# Patient Record
Sex: MALE | Race: WHITE | NOT HISPANIC OR LATINO | URBAN - METROPOLITAN AREA
[De-identification: names, ages, dates, MRNs, and addresses within clinical notes are randomized per-mention and may not be internally consistent; named-entity substitution may affect disease eponyms.]

---

## 2018-05-10 ENCOUNTER — EMERGENCY (EMERGENCY)
Facility: HOSPITAL | Age: 58
LOS: 1 days | Discharge: HOME | End: 2018-05-10
Attending: EMERGENCY MEDICINE | Admitting: EMERGENCY MEDICINE
Payer: COMMERCIAL

## 2018-05-10 VITALS
TEMPERATURE: 98 F | HEART RATE: 75 BPM | DIASTOLIC BLOOD PRESSURE: 65 MMHG | SYSTOLIC BLOOD PRESSURE: 110 MMHG | OXYGEN SATURATION: 98 % | RESPIRATION RATE: 16 BRPM

## 2018-05-10 DIAGNOSIS — Z98.89 OTHER SPECIFIED POSTPROCEDURAL STATES: Chronic | ICD-10-CM

## 2018-05-10 LAB
ALBUMIN SERPL ELPH-MCNC: 4.1 G/DL — SIGNIFICANT CHANGE UP (ref 3.3–5)
ALP SERPL-CCNC: 93 U/L — SIGNIFICANT CHANGE UP (ref 40–120)
ALT FLD-CCNC: 35 U/L — SIGNIFICANT CHANGE UP (ref 4–41)
APPEARANCE UR: CLEAR — SIGNIFICANT CHANGE UP
AST SERPL-CCNC: 22 U/L — SIGNIFICANT CHANGE UP (ref 4–40)
BASE EXCESS BLDV CALC-SCNC: 2.1 MMOL/L — SIGNIFICANT CHANGE UP
BASOPHILS # BLD AUTO: 0.02 K/UL — SIGNIFICANT CHANGE UP (ref 0–0.2)
BASOPHILS NFR BLD AUTO: 0.3 % — SIGNIFICANT CHANGE UP (ref 0–2)
BILIRUB SERPL-MCNC: 0.5 MG/DL — SIGNIFICANT CHANGE UP (ref 0.2–1.2)
BILIRUB UR-MCNC: NEGATIVE — SIGNIFICANT CHANGE UP
BLOOD GAS VENOUS - CREATININE: 0.87 MG/DL — SIGNIFICANT CHANGE UP (ref 0.5–1.3)
BLOOD UR QL VISUAL: NEGATIVE — SIGNIFICANT CHANGE UP
BUN SERPL-MCNC: 15 MG/DL — SIGNIFICANT CHANGE UP (ref 7–23)
CALCIUM SERPL-MCNC: 8.7 MG/DL — SIGNIFICANT CHANGE UP (ref 8.4–10.5)
CHLORIDE BLDV-SCNC: 109 MMOL/L — HIGH (ref 96–108)
CHLORIDE SERPL-SCNC: 102 MMOL/L — SIGNIFICANT CHANGE UP (ref 98–107)
CO2 SERPL-SCNC: 24 MMOL/L — SIGNIFICANT CHANGE UP (ref 22–31)
COLOR SPEC: YELLOW — SIGNIFICANT CHANGE UP
CREAT SERPL-MCNC: 0.9 MG/DL — SIGNIFICANT CHANGE UP (ref 0.5–1.3)
EOSINOPHIL # BLD AUTO: 0.44 K/UL — SIGNIFICANT CHANGE UP (ref 0–0.5)
EOSINOPHIL NFR BLD AUTO: 5.7 % — SIGNIFICANT CHANGE UP (ref 0–6)
GAS PNL BLDV: 138 MMOL/L — SIGNIFICANT CHANGE UP (ref 136–146)
GLUCOSE BLDV-MCNC: 107 — HIGH (ref 70–99)
GLUCOSE SERPL-MCNC: 99 MG/DL — SIGNIFICANT CHANGE UP (ref 70–99)
GLUCOSE UR-MCNC: NEGATIVE — SIGNIFICANT CHANGE UP
HCO3 BLDV-SCNC: 26 MMOL/L — SIGNIFICANT CHANGE UP (ref 20–27)
HCT VFR BLD CALC: 41.8 % — SIGNIFICANT CHANGE UP (ref 39–50)
HCT VFR BLDV CALC: 42.6 % — SIGNIFICANT CHANGE UP (ref 39–51)
HGB BLD-MCNC: 13.6 G/DL — SIGNIFICANT CHANGE UP (ref 13–17)
HGB BLDV-MCNC: 13.9 G/DL — SIGNIFICANT CHANGE UP (ref 13–17)
IMM GRANULOCYTES # BLD AUTO: 0.02 # — SIGNIFICANT CHANGE UP
IMM GRANULOCYTES NFR BLD AUTO: 0.3 % — SIGNIFICANT CHANGE UP (ref 0–1.5)
KETONES UR-MCNC: SIGNIFICANT CHANGE UP
LACTATE BLDV-MCNC: 1.2 MMOL/L — SIGNIFICANT CHANGE UP (ref 0.5–2)
LEUKOCYTE ESTERASE UR-ACNC: NEGATIVE — SIGNIFICANT CHANGE UP
LYMPHOCYTES # BLD AUTO: 2.41 K/UL — SIGNIFICANT CHANGE UP (ref 1–3.3)
LYMPHOCYTES # BLD AUTO: 31.1 % — SIGNIFICANT CHANGE UP (ref 13–44)
MCHC RBC-ENTMCNC: 28.5 PG — SIGNIFICANT CHANGE UP (ref 27–34)
MCHC RBC-ENTMCNC: 32.5 % — SIGNIFICANT CHANGE UP (ref 32–36)
MCV RBC AUTO: 87.6 FL — SIGNIFICANT CHANGE UP (ref 80–100)
MONOCYTES # BLD AUTO: 0.58 K/UL — SIGNIFICANT CHANGE UP (ref 0–0.9)
MONOCYTES NFR BLD AUTO: 7.5 % — SIGNIFICANT CHANGE UP (ref 2–14)
MUCOUS THREADS # UR AUTO: SIGNIFICANT CHANGE UP
NEUTROPHILS # BLD AUTO: 4.27 K/UL — SIGNIFICANT CHANGE UP (ref 1.8–7.4)
NEUTROPHILS NFR BLD AUTO: 55.1 % — SIGNIFICANT CHANGE UP (ref 43–77)
NITRITE UR-MCNC: NEGATIVE — SIGNIFICANT CHANGE UP
NRBC # FLD: 0 — SIGNIFICANT CHANGE UP
PCO2 BLDV: 43 MMHG — SIGNIFICANT CHANGE UP (ref 41–51)
PH BLDV: 7.41 PH — SIGNIFICANT CHANGE UP (ref 7.32–7.43)
PH UR: 6 — SIGNIFICANT CHANGE UP (ref 4.6–8)
PLATELET # BLD AUTO: 275 K/UL — SIGNIFICANT CHANGE UP (ref 150–400)
PMV BLD: 9.8 FL — SIGNIFICANT CHANGE UP (ref 7–13)
PO2 BLDV: 56 MMHG — HIGH (ref 35–40)
POTASSIUM BLDV-SCNC: 3.5 MMOL/L — SIGNIFICANT CHANGE UP (ref 3.4–4.5)
POTASSIUM SERPL-MCNC: 3.8 MMOL/L — SIGNIFICANT CHANGE UP (ref 3.5–5.3)
POTASSIUM SERPL-SCNC: 3.8 MMOL/L — SIGNIFICANT CHANGE UP (ref 3.5–5.3)
PROT SERPL-MCNC: 6.8 G/DL — SIGNIFICANT CHANGE UP (ref 6–8.3)
PROT UR-MCNC: 20 MG/DL — SIGNIFICANT CHANGE UP
RBC # BLD: 4.77 M/UL — SIGNIFICANT CHANGE UP (ref 4.2–5.8)
RBC # FLD: 14.6 % — HIGH (ref 10.3–14.5)
RBC CASTS # UR COMP ASSIST: SIGNIFICANT CHANGE UP (ref 0–?)
SAO2 % BLDV: 88.1 % — HIGH (ref 60–85)
SODIUM SERPL-SCNC: 140 MMOL/L — SIGNIFICANT CHANGE UP (ref 135–145)
SP GR SPEC: 1.03 — SIGNIFICANT CHANGE UP (ref 1–1.04)
UROBILINOGEN FLD QL: NORMAL MG/DL — SIGNIFICANT CHANGE UP
WBC # BLD: 7.74 K/UL — SIGNIFICANT CHANGE UP (ref 3.8–10.5)
WBC # FLD AUTO: 7.74 K/UL — SIGNIFICANT CHANGE UP (ref 3.8–10.5)
WBC UR QL: SIGNIFICANT CHANGE UP (ref 0–?)

## 2018-05-10 PROCEDURE — 99284 EMERGENCY DEPT VISIT MOD MDM: CPT

## 2018-05-10 RX ORDER — SODIUM CHLORIDE 9 MG/ML
1000 INJECTION, SOLUTION INTRAVENOUS ONCE
Qty: 0 | Refills: 0 | Status: COMPLETED | OUTPATIENT
Start: 2018-05-10 | End: 2018-05-10

## 2018-05-10 RX ORDER — ACETAMINOPHEN 500 MG
650 TABLET ORAL ONCE
Qty: 0 | Refills: 0 | Status: COMPLETED | OUTPATIENT
Start: 2018-05-10 | End: 2018-05-10

## 2018-05-10 RX ADMIN — Medication 650 MILLIGRAM(S): at 21:15

## 2018-05-10 RX ADMIN — SODIUM CHLORIDE 1000 MILLILITER(S): 9 INJECTION, SOLUTION INTRAVENOUS at 21:15

## 2018-05-10 RX ADMIN — Medication 650 MILLIGRAM(S): at 22:42

## 2018-05-10 NOTE — ED PROVIDER NOTE - ATTENDING CONTRIBUTION TO CARE
Pt c/o low abd pain  constipation   pain worse with cough  no fever  ?if similar to diverticulitis in the past  exam  pt in no visible distress  clear lungs  card RRR S1S2  abd obese  mild tenderness deep palpation LLQ  and suprapubic  no CVAT  negative Gonzalez's  Imp  h/o diverticulitis and hernia repair  with lower abd pain  no BM today  cold be due to sigmoid inflammation vs early SBO  will image

## 2018-05-10 NOTE — ED PROVIDER NOTE - MEDICAL DECISION MAKING DETAILS
58M hx of diverticulitis, proctitis, s/p b/l inguinal hernia repair presents with a cc of b/l lower abdominal pain c/w prior episodes of diverticulitis however worse a/w decreased BM x1 day, increased straining, no prior hx of SBO, not passing gas, was seen by urgent care today for same cc, instructed to present to ED for eval. Mild dysuria x2 days. exam vss well appearing b/l lower abdominal ttp R testicular cystocele at baseline, exam otherwise unremarkable c/f sbo vs diverticulitis will get labs ua ctap, ivf, pain control, reassess

## 2018-05-10 NOTE — ED PROVIDER NOTE - OBJECTIVE STATEMENT
58M hx of diverticulitis, proctitis, s/p b/l inguinal hernia repair presents with a cc of b/l lower abdominal pain c/w prior episodes of diverticulitis however worse a/w decreased BM x1 day, increased straining, no prior hx of SBO, not passing gas, was seen by urgent care today for same cc, instructed to present to ED for eval. Mild dysuria x2 days. Notes increased abdominal fullness. Hx chronic R testicular hydrocele, at baseline, no c/o testicular pain or discharge. Denies n/v/f/c/cp/sob. Denies headache, syncope, lightheadedness, dizziness. Denies chest palpitations Denies hematuria, hematochezia, BRBPR, tarry stools, diarrhea, constipation.

## 2018-05-10 NOTE — ED ADULT TRIAGE NOTE - CHIEF COMPLAINT QUOTE
pt c/o abd pain x2 days. denies n/v/fever/chills. last normal BM this morning. pt was seen at urgent care. sent into ED for eval to r/o possible bowel obstruction or diverticulitis. pt appears comfortable.  hx- diverticulitis, hernia repair.

## 2018-05-11 VITALS
DIASTOLIC BLOOD PRESSURE: 73 MMHG | RESPIRATION RATE: 16 BRPM | OXYGEN SATURATION: 98 % | HEART RATE: 61 BPM | TEMPERATURE: 99 F | SYSTOLIC BLOOD PRESSURE: 120 MMHG

## 2018-05-11 PROCEDURE — 74177 CT ABD & PELVIS W/CONTRAST: CPT | Mod: 26

## 2018-05-11 RX ORDER — METRONIDAZOLE 500 MG
500 TABLET ORAL ONCE
Qty: 0 | Refills: 0 | Status: DISCONTINUED | OUTPATIENT
Start: 2018-05-11 | End: 2018-05-11

## 2018-05-11 RX ORDER — CIPROFLOXACIN LACTATE 400MG/40ML
500 VIAL (ML) INTRAVENOUS ONCE
Qty: 0 | Refills: 0 | Status: DISCONTINUED | OUTPATIENT
Start: 2018-05-11 | End: 2018-05-11

## 2018-05-11 RX ADMIN — Medication 1 TABLET(S): at 03:28

## 2018-05-11 NOTE — ED ADULT NURSE REASSESSMENT NOTE - NS ED NURSE REASSESS COMMENT FT1
pt medications administered and being DC by MD. pt received education on diagnosis and medications. pt had no further questions. pt receiving a work note and being DC. pt able to ambulate without assistance and leaving the unit in NAD

## 2018-05-12 LAB
BACTERIA UR CULT: SIGNIFICANT CHANGE UP
SPECIMEN SOURCE: SIGNIFICANT CHANGE UP

## 2018-05-29 NOTE — ED ADULT NURSE NOTE - TEMPERATURE IN CELSIUS (DEGREES C)
Paroxetine refilled through upcoming appointment.     Routed to PCP to advise if ok to refill tizanidine.    36.8

## 2019-05-30 ENCOUNTER — EMERGENCY (EMERGENCY)
Facility: HOSPITAL | Age: 59
LOS: 1 days | Discharge: HOME | End: 2019-05-30
Attending: EMERGENCY MEDICINE | Admitting: EMERGENCY MEDICINE
Payer: COMMERCIAL

## 2019-05-30 VITALS
RESPIRATION RATE: 16 BRPM | OXYGEN SATURATION: 100 % | HEART RATE: 68 BPM | TEMPERATURE: 98 F | DIASTOLIC BLOOD PRESSURE: 73 MMHG | SYSTOLIC BLOOD PRESSURE: 143 MMHG

## 2019-05-30 VITALS
SYSTOLIC BLOOD PRESSURE: 151 MMHG | OXYGEN SATURATION: 99 % | RESPIRATION RATE: 18 BRPM | HEART RATE: 70 BPM | DIASTOLIC BLOOD PRESSURE: 65 MMHG | TEMPERATURE: 98 F

## 2019-05-30 VITALS
DIASTOLIC BLOOD PRESSURE: 71 MMHG | TEMPERATURE: 98 F | OXYGEN SATURATION: 97 % | RESPIRATION RATE: 16 BRPM | HEART RATE: 64 BPM | SYSTOLIC BLOOD PRESSURE: 149 MMHG

## 2019-05-30 DIAGNOSIS — Z98.89 OTHER SPECIFIED POSTPROCEDURAL STATES: Chronic | ICD-10-CM

## 2019-05-30 LAB
ALBUMIN SERPL ELPH-MCNC: 4.2 G/DL — SIGNIFICANT CHANGE UP (ref 3.3–5)
ALP SERPL-CCNC: 68 U/L — SIGNIFICANT CHANGE UP (ref 40–120)
ALT FLD-CCNC: 28 U/L — SIGNIFICANT CHANGE UP (ref 4–41)
ANION GAP SERPL CALC-SCNC: 14 MMO/L — SIGNIFICANT CHANGE UP (ref 7–14)
APPEARANCE UR: CLEAR — SIGNIFICANT CHANGE UP
AST SERPL-CCNC: 29 U/L — SIGNIFICANT CHANGE UP (ref 4–40)
BASE EXCESS BLDV CALC-SCNC: 1.8 MMOL/L — SIGNIFICANT CHANGE UP
BASOPHILS # BLD AUTO: 0.02 K/UL — SIGNIFICANT CHANGE UP (ref 0–0.2)
BASOPHILS NFR BLD AUTO: 0.3 % — SIGNIFICANT CHANGE UP (ref 0–2)
BILIRUB SERPL-MCNC: 0.3 MG/DL — SIGNIFICANT CHANGE UP (ref 0.2–1.2)
BILIRUB UR-MCNC: NEGATIVE — SIGNIFICANT CHANGE UP
BLOOD GAS VENOUS - CREATININE: 1.05 MG/DL — SIGNIFICANT CHANGE UP (ref 0.5–1.3)
BLOOD GAS VENOUS - FIO2: 21 — SIGNIFICANT CHANGE UP
BLOOD UR QL VISUAL: NEGATIVE — SIGNIFICANT CHANGE UP
BUN SERPL-MCNC: 13 MG/DL — SIGNIFICANT CHANGE UP (ref 7–23)
CALCIUM SERPL-MCNC: 9.1 MG/DL — SIGNIFICANT CHANGE UP (ref 8.4–10.5)
CHLORIDE BLDV-SCNC: 106 MMOL/L — SIGNIFICANT CHANGE UP (ref 96–108)
CHLORIDE SERPL-SCNC: 103 MMOL/L — SIGNIFICANT CHANGE UP (ref 98–107)
CO2 SERPL-SCNC: 22 MMOL/L — SIGNIFICANT CHANGE UP (ref 22–31)
COLOR SPEC: COLORLESS — SIGNIFICANT CHANGE UP
CREAT SERPL-MCNC: 0.98 MG/DL — SIGNIFICANT CHANGE UP (ref 0.5–1.3)
EOSINOPHIL # BLD AUTO: 0.09 K/UL — SIGNIFICANT CHANGE UP (ref 0–0.5)
EOSINOPHIL NFR BLD AUTO: 1.3 % — SIGNIFICANT CHANGE UP (ref 0–6)
GAS PNL BLDV: 139 MMOL/L — SIGNIFICANT CHANGE UP (ref 136–146)
GLUCOSE BLDV-MCNC: 101 MG/DL — HIGH (ref 70–99)
GLUCOSE SERPL-MCNC: 105 MG/DL — HIGH (ref 70–99)
GLUCOSE UR-MCNC: NEGATIVE — SIGNIFICANT CHANGE UP
HCO3 BLDV-SCNC: 25 MMOL/L — SIGNIFICANT CHANGE UP (ref 20–27)
HCT VFR BLD CALC: 40.7 % — SIGNIFICANT CHANGE UP (ref 39–50)
HCT VFR BLDV CALC: 42.4 % — SIGNIFICANT CHANGE UP (ref 39–51)
HGB BLD-MCNC: 13.5 G/DL — SIGNIFICANT CHANGE UP (ref 13–17)
HGB BLDV-MCNC: 13.8 G/DL — SIGNIFICANT CHANGE UP (ref 13–17)
IMM GRANULOCYTES NFR BLD AUTO: 0.4 % — SIGNIFICANT CHANGE UP (ref 0–1.5)
KETONES UR-MCNC: NEGATIVE — SIGNIFICANT CHANGE UP
LACTATE BLDV-MCNC: 1.4 MMOL/L — SIGNIFICANT CHANGE UP (ref 0.5–2)
LEUKOCYTE ESTERASE UR-ACNC: NEGATIVE — SIGNIFICANT CHANGE UP
LIDOCAIN IGE QN: 17.1 U/L — SIGNIFICANT CHANGE UP (ref 7–60)
LYMPHOCYTES # BLD AUTO: 2.1 K/UL — SIGNIFICANT CHANGE UP (ref 1–3.3)
LYMPHOCYTES # BLD AUTO: 31.3 % — SIGNIFICANT CHANGE UP (ref 13–44)
MCHC RBC-ENTMCNC: 28.8 PG — SIGNIFICANT CHANGE UP (ref 27–34)
MCHC RBC-ENTMCNC: 33.2 % — SIGNIFICANT CHANGE UP (ref 32–36)
MCV RBC AUTO: 86.8 FL — SIGNIFICANT CHANGE UP (ref 80–100)
MONOCYTES # BLD AUTO: 0.66 K/UL — SIGNIFICANT CHANGE UP (ref 0–0.9)
MONOCYTES NFR BLD AUTO: 9.8 % — SIGNIFICANT CHANGE UP (ref 2–14)
NEUTROPHILS # BLD AUTO: 3.82 K/UL — SIGNIFICANT CHANGE UP (ref 1.8–7.4)
NEUTROPHILS NFR BLD AUTO: 56.9 % — SIGNIFICANT CHANGE UP (ref 43–77)
NITRITE UR-MCNC: NEGATIVE — SIGNIFICANT CHANGE UP
NRBC # FLD: 0 K/UL — SIGNIFICANT CHANGE UP (ref 0–0)
PCO2 BLDV: 45 MMHG — SIGNIFICANT CHANGE UP (ref 41–51)
PH BLDV: 7.39 PH — SIGNIFICANT CHANGE UP (ref 7.32–7.43)
PH UR: 6.5 — SIGNIFICANT CHANGE UP (ref 5–8)
PLATELET # BLD AUTO: 253 K/UL — SIGNIFICANT CHANGE UP (ref 150–400)
PMV BLD: 10.3 FL — SIGNIFICANT CHANGE UP (ref 7–13)
PO2 BLDV: 40 MMHG — SIGNIFICANT CHANGE UP (ref 35–40)
POTASSIUM BLDV-SCNC: 3.7 MMOL/L — SIGNIFICANT CHANGE UP (ref 3.4–4.5)
POTASSIUM SERPL-MCNC: 3.9 MMOL/L — SIGNIFICANT CHANGE UP (ref 3.5–5.3)
POTASSIUM SERPL-SCNC: 3.9 MMOL/L — SIGNIFICANT CHANGE UP (ref 3.5–5.3)
PROT SERPL-MCNC: 6.7 G/DL — SIGNIFICANT CHANGE UP (ref 6–8.3)
PROT UR-MCNC: NEGATIVE — SIGNIFICANT CHANGE UP
RBC # BLD: 4.69 M/UL — SIGNIFICANT CHANGE UP (ref 4.2–5.8)
RBC # FLD: 14.9 % — HIGH (ref 10.3–14.5)
RBC CASTS # UR COMP ASSIST: SIGNIFICANT CHANGE UP (ref 0–?)
SAO2 % BLDV: 73 % — SIGNIFICANT CHANGE UP (ref 60–85)
SODIUM SERPL-SCNC: 139 MMOL/L — SIGNIFICANT CHANGE UP (ref 135–145)
SP GR SPEC: 1.01 — SIGNIFICANT CHANGE UP (ref 1–1.04)
UROBILINOGEN FLD QL: NORMAL — SIGNIFICANT CHANGE UP
WBC # BLD: 6.72 K/UL — SIGNIFICANT CHANGE UP (ref 3.8–10.5)
WBC # FLD AUTO: 6.72 K/UL — SIGNIFICANT CHANGE UP (ref 3.8–10.5)
WBC UR QL: SIGNIFICANT CHANGE UP (ref 0–?)

## 2019-05-30 PROCEDURE — 74177 CT ABD & PELVIS W/CONTRAST: CPT | Mod: 26

## 2019-05-30 PROCEDURE — 99284 EMERGENCY DEPT VISIT MOD MDM: CPT | Mod: 25

## 2019-05-30 RX ORDER — SODIUM CHLORIDE 9 MG/ML
1000 INJECTION INTRAMUSCULAR; INTRAVENOUS; SUBCUTANEOUS ONCE
Refills: 0 | Status: COMPLETED | OUTPATIENT
Start: 2019-05-30 | End: 2019-05-30

## 2019-05-30 RX ADMIN — SODIUM CHLORIDE 2000 MILLILITER(S): 9 INJECTION INTRAMUSCULAR; INTRAVENOUS; SUBCUTANEOUS at 05:04

## 2019-05-30 NOTE — ED PROVIDER NOTE - ATTENDING CONTRIBUTION TO CARE
Mccarty: 59 yom with diverticultitis twice yearly, followed by GI. Pt noted pain started today. left sided similar to previous, no associated fever, nausea, or vomiting. Pt is well appearing, no distress, clear lungs, normal cardiac, abd soft, only tender in the llq, no cvat. Pt is currently being treated for UTI and prostatitis. Given possibility of other infectious source besides diverticulitis (althought less likely) will CT, pain meds refused, pt usually takes Augmentin.

## 2019-05-30 NOTE — ED PROVIDER NOTE - OBJECTIVE STATEMENT
59M hx of recurrent diverticulitis presenting with LLQ pain similar to old diverticulitis x 1d. Pt also has a constipation, which he had with prior diverticulitis. Otherwise, denies fever, chills, nausea or vomiting. Also denies any chest pain, back pain. No diarrhea endorsed. Pt has no hx of abd abscess.

## 2019-05-30 NOTE — ED ADULT TRIAGE NOTE - CHIEF COMPLAINT QUOTE
Pt c/o left sided abd pain since yesterday 4pm. States "the pain feels similar to when I had diverticulitis." Also endorsing constipation, last BM yesterday at 6pm. Reports last BM was "hard little pellets." Denies n/v/d, fever. PMHx diverticulitis.

## 2019-05-30 NOTE — ED PROVIDER NOTE - CLINICAL SUMMARY MEDICAL DECISION MAKING FREE TEXT BOX
r/o diverticulitis - will get basics, lipase, gas, ivf, and CT with IVC to r/o abscess and diverticulitis.

## 2019-05-30 NOTE — ED PROVIDER NOTE - NSFOLLOWUPINSTRUCTIONS_ED_ALL_ED_FT
- See the PMD in 2 days for follow up.  - Take Motrin 600mg every 6 hour AND/OR Tylenol 650mg every 4 hour for pain.   - Return to the ED with any worsening of the symptoms.  - See your GI in 2 days for follow up.

## 2019-05-30 NOTE — ED ADULT NURSE NOTE - OBJECTIVE STATEMENT
60 yo M AAOx4 received to room 22 c/o abdominal pain, hx diverticulitis, reports "this pain feels like it did when I had diverticulitis," primarily LLQ but diffuse across lower abdomen, accompanied with constipation, last BM 2 days ago, abd firm, slightly tender and distended, + flatus, MD Mccarty at bedside, 20G placed to LAC, labs drawn and sent, fluids infusing, pt awaiting CT scan at this time, will monitor 58 yo M AAOx4 received to room 22 c/o abdominal pain, hx diverticulitis, reports "this pain feels like it did when I had diverticulitis," primarily LLQ but diffuse across lower abdomen, denies n/v/d, accompanied with constipation, last BM 2 days ago, abd firm, slightly tender and distended, + flatus, MD Mccarty at bedside, 20G placed to LAC, labs drawn and sent, fluids infusing, pt awaiting CT scan at this time, will monitor

## 2019-05-31 VITALS
SYSTOLIC BLOOD PRESSURE: 115 MMHG | DIASTOLIC BLOOD PRESSURE: 72 MMHG | RESPIRATION RATE: 16 BRPM | TEMPERATURE: 97 F | HEART RATE: 64 BPM | OXYGEN SATURATION: 99 %

## 2019-05-31 LAB
ALBUMIN SERPL ELPH-MCNC: 4.4 G/DL — SIGNIFICANT CHANGE UP (ref 3.3–5)
ALP SERPL-CCNC: 69 U/L — SIGNIFICANT CHANGE UP (ref 40–120)
ALT FLD-CCNC: 28 U/L — SIGNIFICANT CHANGE UP (ref 4–41)
ANION GAP SERPL CALC-SCNC: 11 MMO/L — SIGNIFICANT CHANGE UP (ref 7–14)
APPEARANCE UR: CLEAR — SIGNIFICANT CHANGE UP
AST SERPL-CCNC: 27 U/L — SIGNIFICANT CHANGE UP (ref 4–40)
BACTERIA UR CULT: SIGNIFICANT CHANGE UP
BASOPHILS # BLD AUTO: 0.02 K/UL — SIGNIFICANT CHANGE UP (ref 0–0.2)
BASOPHILS NFR BLD AUTO: 0.3 % — SIGNIFICANT CHANGE UP (ref 0–2)
BILIRUB SERPL-MCNC: 0.3 MG/DL — SIGNIFICANT CHANGE UP (ref 0.2–1.2)
BILIRUB UR-MCNC: NEGATIVE — SIGNIFICANT CHANGE UP
BLOOD UR QL VISUAL: NEGATIVE — SIGNIFICANT CHANGE UP
BUN SERPL-MCNC: 7 MG/DL — SIGNIFICANT CHANGE UP (ref 7–23)
CALCIUM SERPL-MCNC: 9.3 MG/DL — SIGNIFICANT CHANGE UP (ref 8.4–10.5)
CHLORIDE SERPL-SCNC: 105 MMOL/L — SIGNIFICANT CHANGE UP (ref 98–107)
CO2 SERPL-SCNC: 25 MMOL/L — SIGNIFICANT CHANGE UP (ref 22–31)
COLOR SPEC: SIGNIFICANT CHANGE UP
CREAT SERPL-MCNC: 0.79 MG/DL — SIGNIFICANT CHANGE UP (ref 0.5–1.3)
EOSINOPHIL # BLD AUTO: 0.06 K/UL — SIGNIFICANT CHANGE UP (ref 0–0.5)
EOSINOPHIL NFR BLD AUTO: 0.8 % — SIGNIFICANT CHANGE UP (ref 0–6)
GLUCOSE SERPL-MCNC: 98 MG/DL — SIGNIFICANT CHANGE UP (ref 70–99)
GLUCOSE UR-MCNC: NEGATIVE — SIGNIFICANT CHANGE UP
HCT VFR BLD CALC: 42.7 % — SIGNIFICANT CHANGE UP (ref 39–50)
HGB BLD-MCNC: 14.1 G/DL — SIGNIFICANT CHANGE UP (ref 13–17)
IMM GRANULOCYTES NFR BLD AUTO: 0.3 % — SIGNIFICANT CHANGE UP (ref 0–1.5)
KETONES UR-MCNC: NEGATIVE — SIGNIFICANT CHANGE UP
LEUKOCYTE ESTERASE UR-ACNC: NEGATIVE — SIGNIFICANT CHANGE UP
LYMPHOCYTES # BLD AUTO: 2.77 K/UL — SIGNIFICANT CHANGE UP (ref 1–3.3)
LYMPHOCYTES # BLD AUTO: 38.5 % — SIGNIFICANT CHANGE UP (ref 13–44)
MAGNESIUM SERPL-MCNC: 2.2 MG/DL — SIGNIFICANT CHANGE UP (ref 1.6–2.6)
MCHC RBC-ENTMCNC: 29 PG — SIGNIFICANT CHANGE UP (ref 27–34)
MCHC RBC-ENTMCNC: 33 % — SIGNIFICANT CHANGE UP (ref 32–36)
MCV RBC AUTO: 87.7 FL — SIGNIFICANT CHANGE UP (ref 80–100)
MONOCYTES # BLD AUTO: 0.72 K/UL — SIGNIFICANT CHANGE UP (ref 0–0.9)
MONOCYTES NFR BLD AUTO: 10 % — SIGNIFICANT CHANGE UP (ref 2–14)
NEUTROPHILS # BLD AUTO: 3.61 K/UL — SIGNIFICANT CHANGE UP (ref 1.8–7.4)
NEUTROPHILS NFR BLD AUTO: 50.1 % — SIGNIFICANT CHANGE UP (ref 43–77)
NITRITE UR-MCNC: NEGATIVE — SIGNIFICANT CHANGE UP
NRBC # FLD: 0 K/UL — SIGNIFICANT CHANGE UP (ref 0–0)
PH UR: 6 — SIGNIFICANT CHANGE UP (ref 5–8)
PHOSPHATE SERPL-MCNC: 3 MG/DL — SIGNIFICANT CHANGE UP (ref 2.5–4.5)
PLATELET # BLD AUTO: 276 K/UL — SIGNIFICANT CHANGE UP (ref 150–400)
PMV BLD: 10.6 FL — SIGNIFICANT CHANGE UP (ref 7–13)
POTASSIUM SERPL-MCNC: 3.9 MMOL/L — SIGNIFICANT CHANGE UP (ref 3.5–5.3)
POTASSIUM SERPL-SCNC: 3.9 MMOL/L — SIGNIFICANT CHANGE UP (ref 3.5–5.3)
PROT SERPL-MCNC: 6.8 G/DL — SIGNIFICANT CHANGE UP (ref 6–8.3)
PROT UR-MCNC: NEGATIVE — SIGNIFICANT CHANGE UP
RBC # BLD: 4.87 M/UL — SIGNIFICANT CHANGE UP (ref 4.2–5.8)
RBC # FLD: 15 % — HIGH (ref 10.3–14.5)
RBC CASTS # UR COMP ASSIST: SIGNIFICANT CHANGE UP (ref 0–?)
SODIUM SERPL-SCNC: 141 MMOL/L — SIGNIFICANT CHANGE UP (ref 135–145)
SP GR SPEC: 1.01 — SIGNIFICANT CHANGE UP (ref 1–1.04)
SPECIMEN SOURCE: SIGNIFICANT CHANGE UP
UROBILINOGEN FLD QL: NORMAL — SIGNIFICANT CHANGE UP
WBC # BLD: 7.2 K/UL — SIGNIFICANT CHANGE UP (ref 3.8–10.5)
WBC # FLD AUTO: 7.2 K/UL — SIGNIFICANT CHANGE UP (ref 3.8–10.5)
WBC UR QL: SIGNIFICANT CHANGE UP (ref 0–?)

## 2019-05-31 PROCEDURE — 76770 US EXAM ABDO BACK WALL COMP: CPT | Mod: 26

## 2019-05-31 PROCEDURE — 71046 X-RAY EXAM CHEST 2 VIEWS: CPT | Mod: 26

## 2019-05-31 RX ORDER — LIDOCAINE 4 G/100G
1 CREAM TOPICAL ONCE
Refills: 0 | Status: COMPLETED | OUTPATIENT
Start: 2019-05-31 | End: 2019-05-31

## 2019-05-31 RX ADMIN — LIDOCAINE 1 PATCH: 4 CREAM TOPICAL at 01:59

## 2019-05-31 NOTE — ED ADULT NURSE NOTE - NSIMPLEMENTINTERV_GEN_ALL_ED
Implemented All Universal Safety Interventions:  [place] to call system. Call bell, personal items and telephone within reach. Instruct patient to call for assistance. Room bathroom lighting operational. Non-slip footwear when patient is off stretcher. Physically safe environment: no spills, clutter or unnecessary equipment. Stretcher in lowest position, wheels locked, appropriate side rails in place.

## 2019-05-31 NOTE — ED PROVIDER NOTE - CLINICAL SUMMARY MEDICAL DECISION MAKING FREE TEXT BOX
60yo M with flank pain. Likely nephrolithiasis vs. MSK pain. Will obtain ultrasound and labwork and then assess and treat. Will give lidocaine patch for pain for now.

## 2019-05-31 NOTE — ED ADULT NURSE REASSESSMENT NOTE - GENERAL PATIENT STATE
comfortable appearance
comfortable appearance/smiling/interactive
comfortable appearance/improvement verbalized/smiling/interactive

## 2019-05-31 NOTE — ED PROVIDER NOTE - PROGRESS NOTE DETAILS
Zaid Ruiz MD: Labwork and ultrasound are normal. Pt endorsing feeling better with hot packs and lidocaine patch. Will d/c.

## 2019-05-31 NOTE — ED PROVIDER NOTE - ATTENDING CONTRIBUTION TO CARE
Nwe: 60yo male with a h/o kidney stones and diverticulosis c/o right sided flank spasms that began at approx 8am this morning. Pt was seen yesterday in the ED for abdominal pain and constipation which has now resolved s/p BM. CT last night was unremarkable. No associated hematuria, dysuria, fevers or chills. Pain has been intermittent and spams every 3-4 minutes. No chest pain or SOB. No abdominal pain, nausea or vomiting. Exam: GENERAL: well appearing, NAD, HEENT: MMM, no scleral icterus, CARDIO: +S1/S2, no murmurs, rubs or gallops, LUNGS: CTA B/L, no wheezing, rales or rhonchi, ABD: soft, nontender, BSx4 quadrants, no guarding or rigidity. MSK: + reproducible right sided posterior rib TTP, no crepitus EXT: No LE edema or calf TTP, 2+ distal pulses x 4 extremities. NEURO: AxOx3, SKIN: no rashes or lesions, well perfused A/P- 60yo male with likely MSK back pain- reproducible on exam. will obtain CXR, renal US and labs. pt declining medications for pain at this time.

## 2019-05-31 NOTE — ED ADULT NURSE REASSESSMENT NOTE - REASSESS COMMUNICATION
Resident Joseph at bedside. pt dc" instructions provided and reviewed. sl out"/ED physician notified

## 2019-05-31 NOTE — ED PROVIDER NOTE - OBJECTIVE STATEMENT
58yo M with PMhx of diverticulosis, and kidney stones p/w right flank pain of one day duration. Pt endorses that once he got home after being discharged he started having right flank pain. The pain occurs every 3-4 minutes and does not radiate anywhere. He denies any fevers, chills, chest pain, abdominal pain, nausea or vomiting. Endorses urinating normally without difficulty. Pt refusing oral pain meds.

## 2019-05-31 NOTE — ED PROVIDER NOTE - NSFOLLOWUPINSTRUCTIONS_ED_ALL_ED_FT
Please follow up with your primary care doctor within the next two weeks. Continue to use hot packs and over the counter lidocaine patches for your pain and continue to stretch.

## 2019-05-31 NOTE — ED ADULT NURSE REASSESSMENT NOTE - NS ED NURSE REASSESS RESPONSE TO CARE
Pt st" Pain still mild 3/10 but I don't need anything I already removed the lido patch"/patient states "ready to go home"
feeling better

## 2019-05-31 NOTE — ED ADULT NURSE NOTE - OBJECTIVE STATEMENT
pt sitting quielty calm smiling affect. Pt st" I was here last night dx with diverticulosis had a ct scan....was discharged but then tonight I got a pain in rt lower back area....I was afraid the ct dye may have done something to my kidney wanted to get checked....I do have hx of kidney stones but in my left side....pain is very mild." Denies urinary diffiuculty denies nausea. MD Votg at bedside. sl and labs sent . awaits ultrasound. pt sitting quielty calm smiling affect. Pt st" I was here last night dx with diverticulosis had a ct scan....was discharged but then tonight I got a pain in rt lower back area....I was afraid the ct dye may have done something to my kidney wanted to get checked....I do have hx of kidney stones but in my left side....pain is very mild." Denies urinary diffiuculty denies nausea. MD Wolff at bedside. sl and labs sent . awaits ultrasound.

## 2019-06-01 LAB
BACTERIA UR CULT: SIGNIFICANT CHANGE UP
SPECIMEN SOURCE: SIGNIFICANT CHANGE UP

## 2022-06-02 ENCOUNTER — APPOINTMENT (OUTPATIENT)
Dept: ORTHOPEDIC SURGERY | Facility: CLINIC | Age: 62
End: 2022-06-02
Payer: OTHER MISCELLANEOUS

## 2022-06-02 VITALS — HEIGHT: 67 IN | BODY MASS INDEX: 31.39 KG/M2 | WEIGHT: 200 LBS

## 2022-06-02 DIAGNOSIS — Z00.00 ENCOUNTER FOR GENERAL ADULT MEDICAL EXAMINATION W/OUT ABNORMAL FINDINGS: ICD-10-CM

## 2022-06-02 PROCEDURE — 99072 ADDL SUPL MATRL&STAF TM PHE: CPT

## 2022-06-02 PROCEDURE — 73030 X-RAY EXAM OF SHOULDER: CPT | Mod: LT

## 2022-06-02 PROCEDURE — 99204 OFFICE O/P NEW MOD 45 MIN: CPT

## 2022-06-02 NOTE — PHYSICAL EXAM
[Left] : left shoulder [Standing] : standing [Mild] : mild [5 ___] : forward flexion 5[unfilled]/5 [5___] : internal rotation 5[unfilled]/5 [] : negative drop-arm test [TWNoteComboBox7] : active forward flexion 120 degrees [de-identified] : active abduction 90 degrees [TWNoteComboBox6] : internal rotation L4 [de-identified] : external rotation 60 degrees

## 2022-06-02 NOTE — IMAGING
[Left] : left shoulder [Type 2 acromion] : Type 2 acromion [AC Joint Arthrosis] : AC Joint Arthrosis

## 2022-06-02 NOTE — HISTORY OF PRESENT ILLNESS
[8] : 8 [0] : 0 [Localized] : localized [Sharp] : sharp [Work] : work [Exercising] : exercising [] : Post Surgical Visit: no [FreeTextEntry5] :  5/11/22. Patient was chasing after a patient and fell. Injured the left shoulder. Patient complains about sharp pain during certain movements. [de-identified] :

## 2022-06-02 NOTE — DISCUSSION/SUMMARY
[de-identified] : General Dx Discussion\par The patient was advised of the diagnosis. The natural history of the pathology was explained in full to the patient in layman's terms. All questions were answered. The risks and benefits of surgical and non-surgical treatment alternatives were explained in full to the patient.\par \par no work \par pt reports no limited or restricted duty is offered \par will get a mri to eval for RCT

## 2022-06-13 ENCOUNTER — APPOINTMENT (OUTPATIENT)
Dept: ORTHOPEDIC SURGERY | Facility: CLINIC | Age: 62
End: 2022-06-13
Payer: OTHER MISCELLANEOUS

## 2022-06-13 VITALS — WEIGHT: 200 LBS | HEIGHT: 67 IN | BODY MASS INDEX: 31.39 KG/M2

## 2022-06-13 PROCEDURE — J3490M: CUSTOM

## 2022-06-13 PROCEDURE — 99214 OFFICE O/P EST MOD 30 MIN: CPT | Mod: 25

## 2022-06-13 PROCEDURE — 20611 DRAIN/INJ JOINT/BURSA W/US: CPT

## 2022-06-13 PROCEDURE — 99072 ADDL SUPL MATRL&STAF TM PHE: CPT

## 2022-06-13 NOTE — DISCUSSION/SUMMARY
[de-identified] : General Dx discussion\par The patient was advised of the diagnosis. The natural history of the pathology was explained in full to the patient in layman's terms. All questions were answered. The risks and benefits of surgical and non-surgical treatment alternatives were explained in full to the patient. \par \par will get a injection lt shoulder and PT \par poss of further care and surgery discussed

## 2022-06-13 NOTE — PROCEDURE
[Large Joint Injection] : Large joint injection [Left] : of the left [Pain] : pain [Inflammation] : inflammation [Alcohol] : alcohol [Betadine] : betadine [Ethyl Chloride sprayed topically] : ethyl chloride sprayed topically [Sterile technique used] : sterile technique used [___ cc    3mg] :  Betamethasone (Celestone) ~Vcc of 3mg [___ cc    1%] : Lidocaine ~Vcc of 1%  [___ cc    0.25%] : Bupivacaine (Marcaine) ~Vcc of 0.25%  [] : Patient tolerated procedure well [Call if redness, pain or fever occur] : call if redness, pain or fever occur [Apply ice for 15min out of every hour for the next 12-24 hours as tolerated] : apply ice for 15 minutes out of every hour for the next 12-24 hours as tolerated [Previous OTC use and PT nontherapeutic] : patient has tried OTC's including aspirin, Ibuprofen, Aleve, etc or prescription NSAIDS, and/or exercises at home and/or physical therapy without satisfactory response [Patient had decreased mobility in the joint] : patient had decreased mobility in the joint [Risks, benefits, alternatives discussed / Verbal consent obtained] : the risks benefits, and alternatives have been discussed, and verbal consent was obtained [All ultrasound images have been permanently captured and stored accordingly in our picture archiving and communication system] : All ultrasound images have been permanently captured and stored accordingly in our picture archiving and communication system [Visualization of the needle and placement of injection was performed without complication] : visualization of the needle and placement of injection was performed without complication [Glenohumeral Joint] : glenohumeral joint [Glenohmeral injection] : glenohumeral injection [Prior failure or difficult injection] : prior failure or difficult injection

## 2022-06-13 NOTE — PHYSICAL EXAM
[Left] : left shoulder [Standing] : standing [Mild] : mild [5 ___] : forward flexion 5[unfilled]/5 [5___] : internal rotation 5[unfilled]/5 [] : negative drop-arm test [TWNoteComboBox7] : active forward flexion 130 degrees [de-identified] : active abduction 90 degrees [TWNoteComboBox6] : internal rotation L4 [de-identified] : external rotation 60 degrees

## 2022-06-13 NOTE — REASON FOR VISIT
DRU had a scheduled ZOOM visit for the patient.  The patient did not arrive in the ZOOM format.  DRU reached out to patient via telephone and there was no response.  A message was left on the VM.    15 min   [FreeTextEntry2] : MRI-left shoulder

## 2022-06-13 NOTE — HISTORY OF PRESENT ILLNESS
[8] : 8 [1] : 2 [Sharp] : sharp [Stabbing] : stabbing [Not working due to injury] : Work status: not working due to injury [de-identified] : Patient is here for MRI results  of his left shoulder. [] : Post Surgical Visit: no [FreeTextEntry1] : left shoulder [de-identified] : pt ha a mri done  [de-identified] : e

## 2022-07-11 ENCOUNTER — APPOINTMENT (OUTPATIENT)
Dept: ORTHOPEDIC SURGERY | Facility: CLINIC | Age: 62
End: 2022-07-11
Payer: OTHER MISCELLANEOUS

## 2022-07-11 VITALS — BODY MASS INDEX: 31.39 KG/M2 | HEIGHT: 67 IN | WEIGHT: 200 LBS

## 2022-07-11 PROCEDURE — 99214 OFFICE O/P EST MOD 30 MIN: CPT

## 2022-07-11 PROCEDURE — 99072 ADDL SUPL MATRL&STAF TM PHE: CPT | Mod: ACP

## 2022-07-11 PROCEDURE — 99072 ADDL SUPL MATRL&STAF TM PHE: CPT

## 2022-07-11 PROCEDURE — 99214 OFFICE O/P EST MOD 30 MIN: CPT | Mod: ACP

## 2022-07-11 NOTE — REASON FOR VISIT
[FreeTextEntry2] : 7.11.22 Follow up Left  shoulder. Cortisone injection at last visit only helped for few days. PT helps, but only minimally.

## 2022-07-11 NOTE — DISCUSSION/SUMMARY
[de-identified] : General Dx discussion\par The patient was advised of the diagnosis. The natural history of the pathology was explained in full to the patient in layman's terms. All questions were answered. The risks and benefits of surgical and non-surgical treatment alternatives were explained in full to the patient. \par \par no work \par f/u 4 weeks \par does not feel safe to go back to work at this time

## 2022-07-11 NOTE — PHYSICAL EXAM
[Left] : left shoulder [Standing] : standing [Mild] : mild [5 ___] : forward flexion 5[unfilled]/5 [5___] : internal rotation 5[unfilled]/5 [] : negative drop-arm test [TWNoteComboBox7] : active forward flexion 120 degrees [de-identified] : active abduction 75 degrees [TWNoteComboBox6] : internal rotation L4 [de-identified] : external rotation 60 degrees

## 2022-07-11 NOTE — HISTORY OF PRESENT ILLNESS
[Work related] : work related [Gradual] : gradual [7] : 7 [1] : 2 [Sharp] : sharp [Stabbing] : stabbing [Constant] : constant [Not working due to injury] : Work status: not working due to injury [Steroid] : Steroid [] : Post Surgical Visit: no [FreeTextEntry1] : left shoulder [FreeTextEntry3] : 5.11.2022 [de-identified] : pt ha a mri done  [de-identified] : 6.13.22 [de-identified] : left shoulder [de-identified] : sore [TWNoteComboBox1] : 0%

## 2022-07-13 ENCOUNTER — EMERGENCY (EMERGENCY)
Facility: HOSPITAL | Age: 62
LOS: 1 days | Discharge: HOME | End: 2022-07-13
Attending: EMERGENCY MEDICINE | Admitting: EMERGENCY MEDICINE

## 2022-07-13 VITALS
SYSTOLIC BLOOD PRESSURE: 139 MMHG | TEMPERATURE: 98 F | RESPIRATION RATE: 18 BRPM | HEART RATE: 65 BPM | DIASTOLIC BLOOD PRESSURE: 66 MMHG | OXYGEN SATURATION: 100 %

## 2022-07-13 VITALS
OXYGEN SATURATION: 100 % | RESPIRATION RATE: 16 BRPM | DIASTOLIC BLOOD PRESSURE: 82 MMHG | HEART RATE: 61 BPM | SYSTOLIC BLOOD PRESSURE: 129 MMHG

## 2022-07-13 DIAGNOSIS — Z98.89 OTHER SPECIFIED POSTPROCEDURAL STATES: Chronic | ICD-10-CM

## 2022-07-13 LAB
ALBUMIN SERPL ELPH-MCNC: 4.6 G/DL — SIGNIFICANT CHANGE UP (ref 3.3–5)
ALP SERPL-CCNC: 71 U/L — SIGNIFICANT CHANGE UP (ref 40–120)
ALT FLD-CCNC: 19 U/L — SIGNIFICANT CHANGE UP (ref 4–41)
ANION GAP SERPL CALC-SCNC: 14 MMOL/L — SIGNIFICANT CHANGE UP (ref 7–14)
AST SERPL-CCNC: 18 U/L — SIGNIFICANT CHANGE UP (ref 4–40)
BASOPHILS # BLD AUTO: 0.03 K/UL — SIGNIFICANT CHANGE UP (ref 0–0.2)
BASOPHILS NFR BLD AUTO: 0.4 % — SIGNIFICANT CHANGE UP (ref 0–2)
BILIRUB SERPL-MCNC: 0.5 MG/DL — SIGNIFICANT CHANGE UP (ref 0.2–1.2)
BUN SERPL-MCNC: 10 MG/DL — SIGNIFICANT CHANGE UP (ref 7–23)
CALCIUM SERPL-MCNC: 9.8 MG/DL — SIGNIFICANT CHANGE UP (ref 8.4–10.5)
CHLORIDE SERPL-SCNC: 102 MMOL/L — SIGNIFICANT CHANGE UP (ref 98–107)
CO2 SERPL-SCNC: 23 MMOL/L — SIGNIFICANT CHANGE UP (ref 22–31)
CREAT SERPL-MCNC: 0.81 MG/DL — SIGNIFICANT CHANGE UP (ref 0.5–1.3)
EGFR: 100 ML/MIN/1.73M2 — SIGNIFICANT CHANGE UP
EOSINOPHIL # BLD AUTO: 0.08 K/UL — SIGNIFICANT CHANGE UP (ref 0–0.5)
EOSINOPHIL NFR BLD AUTO: 1 % — SIGNIFICANT CHANGE UP (ref 0–6)
GLUCOSE SERPL-MCNC: 108 MG/DL — HIGH (ref 70–99)
HCT VFR BLD CALC: 43.3 % — SIGNIFICANT CHANGE UP (ref 39–50)
HGB BLD-MCNC: 14.6 G/DL — SIGNIFICANT CHANGE UP (ref 13–17)
IANC: 4.55 K/UL — SIGNIFICANT CHANGE UP (ref 1.8–7.4)
IMM GRANULOCYTES NFR BLD AUTO: 0.4 % — SIGNIFICANT CHANGE UP (ref 0–1.5)
LYMPHOCYTES # BLD AUTO: 2.63 K/UL — SIGNIFICANT CHANGE UP (ref 1–3.3)
LYMPHOCYTES # BLD AUTO: 33.2 % — SIGNIFICANT CHANGE UP (ref 13–44)
MCHC RBC-ENTMCNC: 28.8 PG — SIGNIFICANT CHANGE UP (ref 27–34)
MCHC RBC-ENTMCNC: 33.7 GM/DL — SIGNIFICANT CHANGE UP (ref 32–36)
MCV RBC AUTO: 85.4 FL — SIGNIFICANT CHANGE UP (ref 80–100)
MONOCYTES # BLD AUTO: 0.59 K/UL — SIGNIFICANT CHANGE UP (ref 0–0.9)
MONOCYTES NFR BLD AUTO: 7.5 % — SIGNIFICANT CHANGE UP (ref 2–14)
NEUTROPHILS # BLD AUTO: 4.55 K/UL — SIGNIFICANT CHANGE UP (ref 1.8–7.4)
NEUTROPHILS NFR BLD AUTO: 57.5 % — SIGNIFICANT CHANGE UP (ref 43–77)
NRBC # BLD: 0 /100 WBCS — SIGNIFICANT CHANGE UP
NRBC # FLD: 0 K/UL — SIGNIFICANT CHANGE UP
PLATELET # BLD AUTO: 308 K/UL — SIGNIFICANT CHANGE UP (ref 150–400)
POTASSIUM SERPL-MCNC: 4.1 MMOL/L — SIGNIFICANT CHANGE UP (ref 3.5–5.3)
POTASSIUM SERPL-SCNC: 4.1 MMOL/L — SIGNIFICANT CHANGE UP (ref 3.5–5.3)
PROT SERPL-MCNC: 7.2 G/DL — SIGNIFICANT CHANGE UP (ref 6–8.3)
RBC # BLD: 5.07 M/UL — SIGNIFICANT CHANGE UP (ref 4.2–5.8)
RBC # FLD: 14.8 % — HIGH (ref 10.3–14.5)
SODIUM SERPL-SCNC: 139 MMOL/L — SIGNIFICANT CHANGE UP (ref 135–145)
TROPONIN T, HIGH SENSITIVITY RESULT: <6 NG/L — SIGNIFICANT CHANGE UP
WBC # BLD: 7.91 K/UL — SIGNIFICANT CHANGE UP (ref 3.8–10.5)
WBC # FLD AUTO: 7.91 K/UL — SIGNIFICANT CHANGE UP (ref 3.8–10.5)

## 2022-07-13 PROCEDURE — 93010 ELECTROCARDIOGRAM REPORT: CPT

## 2022-07-13 PROCEDURE — 99285 EMERGENCY DEPT VISIT HI MDM: CPT | Mod: 25

## 2022-07-13 RX ORDER — POLYETHYLENE GLYCOL 3350 17 G/17G
17 POWDER, FOR SOLUTION ORAL ONCE
Refills: 0 | Status: COMPLETED | OUTPATIENT
Start: 2022-07-13 | End: 2022-07-13

## 2022-07-13 RX ADMIN — POLYETHYLENE GLYCOL 3350 17 GRAM(S): 17 POWDER, FOR SOLUTION ORAL at 06:06

## 2022-07-13 NOTE — ED PROVIDER NOTE - ATTENDING CONTRIBUTION TO CARE
63 yo M with PMH of diverticulitis with an episode of near syncope.  Is currently being treated for diverticulitis with PO ABx.  Was having some discomfort and started to feel sweaty and lightheaded.  Laid down in bed and symptoms resolved.  Has been on Augmentin for diverticulitis.  Developed a rash on his L thigh and he is concerned he is allergic.    Gen: Well appearing in NAD  Head: NC/AT  Neck: trachea midline  Resp:  No distress  Ext: no deformities  Neuro:  A&O appears non focal  Skin:  Warm and dry as visualized there is an erythematous, blanching, rash to the L outer thigh.  Psych:  Normal affect and mood     63 yo with an episode of near syncope.  Most consistent with a vasovagal episode in the setting of having some pain.  Rash is not an allergic reaction.  Will get labs to ensure no other etiology such as anemia or lyte abnormality for his episode.  No signs of arrythmia on EKG.  Will watch on tele for ectopy while in the ED

## 2022-07-13 NOTE — ED PROVIDER NOTE - PROGRESS NOTE DETAILS
Delores Grimes MD PGY2: Patient states he is having an allergic reaction to the Augmentin given rash on leg. Rash does not appear to be an allergic reaction on exam. Circular erythema, non-raised, not warm. Patient can continue current antibiotic. Patient would like Miralax for constipation, does not want something stronger at this time.

## 2022-07-13 NOTE — ED PROVIDER NOTE - CLINICAL SUMMARY MEDICAL DECISION MAKING FREE TEXT BOX
Patient is a 62y M PMhx DM diverticulitis presenting with near syncope earlier today. Diaphoresis likely vagal. Low suspicion for ACS, will get labs, EKG, trop. Abdomen soft, non-tender. Do not suspect perforation. Uncomplicated diverticulitis most likely diagnosis. Patient to continue abx. Hemodynamically stable, afebrile.

## 2022-07-13 NOTE — ED ADULT TRIAGE NOTE - CHIEF COMPLAINT QUOTE
Pt c/o near syncope this afternoon after feeling diaphoretic. Endorses rash to legs. Denies chest pain, sob, nausea/vomiting, fevers/chills. Diagnosed with Diverticulitis on 7/9 and started on Amoxicillin. Pmhx: DM

## 2022-07-13 NOTE — ED ADULT NURSE NOTE - OBJECTIVE STATEMENT
Patient received in 29, A/Ox4, ambulatory, c/o allergic reaction. Patient states he was given augmentin for his diverticulitis and woke up feeling lightheaded, diaphoretic and noticed a rash to his right upper thigh. States he laid down for a few hours and felt better. Denies SOB, fever, chills, N/V and chest pain. Patient butterflied for labs. Bed in lowest position, call bell within reach.

## 2022-07-13 NOTE — ED PROVIDER NOTE - OBJECTIVE STATEMENT
Patient is a 62y M PMhx DM diverticulitis presenting with near syncope earlier today. Patient states he was standing, felt diaphoretic and light headed. Laid down for a couple hours and felt better. Patinent went to  yesterday for LLQ pain and was given augmentin for diverticulitis (allergy to cipro flagyl). Abdominal pain has since nearly resolved. Having BM but they are small. Denies CP, SOB, fevers, dysuria, melena, abdominal pain, n/v/d.

## 2022-07-13 NOTE — ED PROVIDER NOTE - PHYSICAL EXAMINATION
GENERAL: no acute distress, non-toxic appearing  HEAD: normocephalic, atraumatic  HEENT: PERRLA, EOMI  CARDIAC: regular rate and rhythm  PULM: clear to ascultation bilaterally  GI: abdomen nondistended, soft, nontender, no guarding or rebound tenderness  NEURO: alert and oriented x 3, normal speech, no gross neurologic deficit  MSK: no visible deformities  SKIN: circular erythema on left thigh, non-raised, no purulence, no swelling

## 2022-07-13 NOTE — ED PROVIDER NOTE - PATIENT PORTAL LINK FT
You can access the FollowMyHealth Patient Portal offered by [facility] by registering at the following website: [email or web address]. By joining [employer]’s FollowMyHealth portal, you will also be able to view your health information using other applications (apps) compatible with our system.

## 2022-07-13 NOTE — ED ADULT NURSE NOTE - NSIMPLEMENTINTERV_GEN_ALL_ED
Implemented All Universal Safety Interventions:  [place] to call system. Call bell, personal items and telephone within reach. Instruct patient to call for assistance. Room bathroom lighting operational. Non-slip footwear when patient is off stretcher. Physically safe environment: no spills, clutter or unnecessary equipment. Stretcher in lowest position, wheels locked, appropriate side rails in place.

## 2022-07-13 NOTE — ED PROVIDER NOTE - NSFOLLOWUPINSTRUCTIONS_ED_ALL_ED_FT
You were seen in the ED, based on your evaluation there are no emergent findings at this time.    Continue taking your current antibiotics. Stop them and call your doctor if you develop extensive rash, difficulty breathing, or vomiting.    Please follow-up with your primary care doctor.    Take Tylenol every 4-6 hours as needed for pain.    ***Return to the ED if you have any new or worsening symptoms such as severe abdominal pain, fevers, or any other concerning symptoms*** You were seen in the ED, based on your evaluation there are no emergent findings at this time.    Continue taking your current antibiotics. Stop them and call your doctor if you develop extensive rash, difficulty breathing, or vomiting.    Please follow-up with your primary care doctor.    Take Tylenol every 4-6 hours as needed for pain.    Miralax instructions:  Take this medication by mouth. Take it as directed on the label. Add the right dose to 4 to 8 ounces or 120 to 240 mL of water, juice, soda, coffee or tea. Do not mix this medication with foods or other liquids. Do not combine with starch-based thickeners (e.g., flour, cornstarch, arrowroot, tapioca, xanthan gum). Mix well. Drink the solution. Do not use it more often than directed.    ***Return to the ED if you have any new or worsening symptoms such as severe abdominal pain, fevers, or any other concerning symptoms***

## 2022-08-08 ENCOUNTER — APPOINTMENT (OUTPATIENT)
Dept: ORTHOPEDIC SURGERY | Facility: CLINIC | Age: 62
End: 2022-08-08

## 2022-08-08 VITALS — BODY MASS INDEX: 31.39 KG/M2 | WEIGHT: 200 LBS | HEIGHT: 67 IN

## 2022-08-08 PROCEDURE — 99214 OFFICE O/P EST MOD 30 MIN: CPT

## 2022-08-08 PROCEDURE — 99072 ADDL SUPL MATRL&STAF TM PHE: CPT

## 2022-08-08 NOTE — HISTORY OF PRESENT ILLNESS
[Work related] : work related [8] : 8 [0] : 0 [Sharp] : sharp [Not working due to injury] : Work status: not working due to injury [de-identified] : Patient is here for a WC follow up on his left shoulder, DOI 7/11/22. [] : no [FreeTextEntry1] : left shoulder [FreeTextEntry3] : 5/11/22 [de-identified] : certain movements [de-identified] : Physical therapy 2 x a week, HEP.

## 2022-08-08 NOTE — DISCUSSION/SUMMARY
[de-identified] : General Dx discussion\par The patient was advised of the diagnosis. The natural history of the pathology was explained in full to the patient in layman's terms. All questions were answered. The risks and benefits of surgical and non-surgical treatment alternatives were explained in full to the patient. \par \par no work \par f/u 6 weeks \par does not feel safe to go back to work at this time \par has made progress with PT

## 2022-08-08 NOTE — PHYSICAL EXAM
[Left] : left shoulder [Standing] : standing [Mild] : mild [5 ___] : forward flexion 5[unfilled]/5 [5___] : internal rotation 5[unfilled]/5 [] : negative drop-arm test [TWNoteComboBox7] : active forward flexion 145 degrees [de-identified] : active abduction 110 degrees [TWNoteComboBox6] : internal rotation L4 [de-identified] : external rotation 65 degrees

## 2022-09-19 ENCOUNTER — APPOINTMENT (OUTPATIENT)
Dept: ORTHOPEDIC SURGERY | Facility: CLINIC | Age: 62
End: 2022-09-19

## 2022-09-19 VITALS — HEIGHT: 67 IN | BODY MASS INDEX: 31.08 KG/M2 | WEIGHT: 198 LBS

## 2022-09-19 PROCEDURE — 99214 OFFICE O/P EST MOD 30 MIN: CPT

## 2022-09-19 PROCEDURE — 99072 ADDL SUPL MATRL&STAF TM PHE: CPT

## 2022-09-19 NOTE — HISTORY OF PRESENT ILLNESS
[Work related] : work related [Sudden] : sudden [8] : 8 [0] : 0 [Sharp] : sharp [Frequent] : frequent [Rest] : rest [Not working due to injury] : Work status: not working due to injury [de-identified] : Patient is here for a WC follow up on his left shoulder, DOI 5/11/22. [] : no [FreeTextEntry1] : Left shoulder [FreeTextEntry3] : 5/11/22 [de-identified] : HEP

## 2022-09-19 NOTE — REASON FOR VISIT
1.  Take antibiotics as prescribed. 2.  Symptomatic treatment with hot showers, steam inhalation, fluids, [place] Sun[place], cough suppressants. [FreeTextEntry2] : WC follow up-left shoulder\par has cont pain, PT has been denied has been doing a HEP

## 2022-09-19 NOTE — DISCUSSION/SUMMARY
[de-identified] : General Dx discussion\par The patient was advised of the diagnosis. The natural history of the pathology was explained in full to the patient in layman's terms. All questions were answered. The risks and benefits of surgical and non-surgical treatment alternatives were explained in full to the patient. 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 \par Risks, benefits and alternatives discussed. Risks include, but are not limited to infection, blood clot, nerve damage, recurrent tear, loss of motion, continued pain, worsened pain, need for another surgery in the future. Recurrent tear discussed \par Discussed that the surgery will not address any pain that he has from any OA he may have. He understands he will not be 100% better after surgery. Prolonged immobilization and rehabilitation discussed. Work limitations discussed.\par Questions answered.\par He expressed understanding and would like to proceed.\par \par The patient was advised of the diagnosis.  The natural history of the pathology was explained in full to the patient in layman's terms. All questions were answered.  The risks and benefits of surgical and non-surgical treatment alternatives were explained in full to the patient.  \par The patient demonstrated a full understanding of the surgical and non-surgical options.  The risks of surgery were outlined in full to the patient including but not limited to bleeding, scarring, infection, sepsis, neurologic injury, vascular injury, failure to resolve symptoms, symptom recurrence, the need for further surgery, non-healing, wound breakdown, deep vein thrombosis, pulmonary embolism, spontaneous osteonecrosis, anesthesia complications and even death.  The patient understood all the risks and accepted them and understood that other complications could occur that are not mentioned above.  The intraoperative plan, post-operative plan, post-operative expectations and limitations were explained in full.  Expectations from non-surgical treatment were explained in full as well.  The patient demonstrated a complete understanding of the treatment alternatives and requested the above-mentioned procedure.  This will be scheduled accordingly.\par \par \par \par

## 2022-09-19 NOTE — PHYSICAL EXAM
[Left] : left shoulder [Standing] : standing [Mild] : mild [5 ___] : forward flexion 5[unfilled]/5 [5___] : internal rotation 5[unfilled]/5 [] : negative drop-arm test [TWNoteComboBox7] : active forward flexion 140 degrees [de-identified] : active abduction 100 degrees [TWNoteComboBox6] : internal rotation L4 [de-identified] : external rotation 60 degrees

## 2022-10-13 ENCOUNTER — FORM ENCOUNTER (OUTPATIENT)
Age: 62
End: 2022-10-13

## 2022-10-20 ENCOUNTER — APPOINTMENT (OUTPATIENT)
Dept: ORTHOPEDIC SURGERY | Facility: CLINIC | Age: 62
End: 2022-10-20

## 2022-10-20 VITALS — WEIGHT: 198 LBS | HEIGHT: 67 IN | BODY MASS INDEX: 31.08 KG/M2

## 2022-10-20 PROCEDURE — 99072 ADDL SUPL MATRL&STAF TM PHE: CPT

## 2022-10-20 PROCEDURE — 99214 OFFICE O/P EST MOD 30 MIN: CPT

## 2022-10-20 NOTE — PHYSICAL EXAM
[Left] : left shoulder [Standing] : standing [Mild] : mild [5 ___] : forward flexion 5[unfilled]/5 [5___] : internal rotation 5[unfilled]/5 [] : negative drop-arm test [TWNoteComboBox7] : active forward flexion 140 degrees [de-identified] : active abduction 100 degrees [TWNoteComboBox6] : internal rotation L4 [de-identified] : external rotation 60 degrees

## 2022-10-20 NOTE — DISCUSSION/SUMMARY
[de-identified] : General Dx discussion\par The patient was advised of the diagnosis. The natural history of the pathology was explained in full to the patient in layman's terms. All questions were answered. The risks and benefits of surgical and non-surgical treatment alternatives were explained in full to the patient. \par \par Case Discussed.\par MRI reviewed, has with pain and loss of motion, he has failed conservative care PT, injection and he cannot take NSAIDs due to gastritis. \par His Surgery need to be approved as requested due to failure of conservative care. \par  \par Arthroscopy with decompression, debridement poss RCR discussed open AC resection. \par Risks, benefits and alternatives discussed. Risks include, but are not limited to infection, blood clot, nerve damage, recurrent tear, loss of motion, continued pain, worsened pain, need for another surgery in the future. Recurrent tear discussed \par Discussed that the surgery will not address any pain that he has from any OA he may have. He understands he will not be 100% better after surgery. Prolonged immobilization and rehabilitation discussed. Work limitations discussed.\par Questions answered.\par He expressed understanding and would like to proceed.\par \par The patient was advised of the diagnosis.  The natural history of the pathology was explained in full to the patient in layman's terms. All questions were answered.  The risks and benefits of surgical and non-surgical treatment alternatives were explained in full to the patient.  \par The patient demonstrated a full understanding of the surgical and non-surgical options.  The risks of surgery were outlined in full to the patient including but not limited to bleeding, scarring, infection, sepsis, neurologic injury, vascular injury, failure to resolve symptoms, symptom recurrence, the need for further surgery, non-healing, wound breakdown, deep vein thrombosis, pulmonary embolism, spontaneous osteonecrosis, anesthesia complications and even death.  The patient understood all the risks and accepted them and understood that other complications could occur that are not mentioned above.  The intraoperative plan, post-operative plan, post-operative expectations and limitations were explained in full.  Expectations from non-surgical treatment were explained in full as well.  The patient demonstrated a complete understanding of the treatment alternatives and requested the above-mentioned procedure.  This will be scheduled accordingly.\par \par \par \par \par

## 2022-10-20 NOTE — HISTORY OF PRESENT ILLNESS
[Work related] : work related [8] : 8 [2] : 2 [Sharp] : sharp [Frequent] : frequent [Work] : work [Nothing helps with pain getting better] : Nothing helps with pain getting better [Extending back] : extending back [Light duty] : Work status: light duty [de-identified] : WC left shoulder, DOI 5/11/22. [] : no [FreeTextEntry1] : left shoulder [FreeTextEntry3] : 5/11/22 [de-identified] : lifting, certain movements [de-identified] : HEP

## 2022-10-20 NOTE — REASON FOR VISIT
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

## 2022-11-17 ENCOUNTER — APPOINTMENT (OUTPATIENT)
Dept: ORTHOPEDIC SURGERY | Facility: CLINIC | Age: 62
End: 2022-11-17

## 2022-11-17 VITALS — BODY MASS INDEX: 31.08 KG/M2 | HEIGHT: 67 IN | WEIGHT: 198 LBS

## 2022-11-17 PROCEDURE — 99072 ADDL SUPL MATRL&STAF TM PHE: CPT

## 2022-11-17 PROCEDURE — 99214 OFFICE O/P EST MOD 30 MIN: CPT

## 2022-11-17 NOTE — DISCUSSION/SUMMARY
[de-identified] : General Dx discussion\par The patient was advised of the diagnosis. The natural history of the pathology was explained in full to the patient in layman's terms. All questions were answered. The risks and benefits of surgical and non-surgical treatment alternatives were explained in full to the patient. \par \par Case Discussed.\par MRI reviewed, has with pain and loss of motion, he has failed conservative care PT, injection and he cannot take NSAIDs due to gastritis. \par His Surgery need to be approved as requested due to failure of conservative care. \par  \par

## 2022-11-17 NOTE — HISTORY OF PRESENT ILLNESS
[Work related] : work related [8] : 8 [0] : 0 [Sharp] : sharp [Intermittent] : intermittent [Work] : work [Nothing helps with pain getting better] : Nothing helps with pain getting better [Light duty] : Work status: light duty [de-identified] : Patient is here for a follow up on his left shoulder. He feels it hanging below the right shoulder.  DOI 5/11/22. [] : no [FreeTextEntry1] : left shoulder [FreeTextEntry3] : 5/11/22 [de-identified] : Certain movements

## 2022-11-17 NOTE — REASON FOR VISIT
[FreeTextEntry2] : WC follow up-left shoulder\par reports cont pain \par states he has been to a [facility]

## 2022-11-17 NOTE — PHYSICAL EXAM
[Left] : left shoulder [Standing] : standing [Mild] : mild [5 ___] : forward flexion 5[unfilled]/5 [5___] : internal rotation 5[unfilled]/5 [] : negative drop-arm test [TWNoteComboBox7] : active forward flexion 140 degrees [de-identified] : active abduction 100 degrees [TWNoteComboBox6] : internal rotation L4 [de-identified] : external rotation 65 degrees

## 2022-12-08 ENCOUNTER — EMERGENCY (EMERGENCY)
Facility: HOSPITAL | Age: 62
LOS: 1 days | Discharge: HOME | End: 2022-12-08
Attending: STUDENT IN AN ORGANIZED HEALTH CARE EDUCATION/TRAINING PROGRAM | Admitting: STUDENT IN AN ORGANIZED HEALTH CARE EDUCATION/TRAINING PROGRAM

## 2022-12-08 VITALS
HEART RATE: 65 BPM | RESPIRATION RATE: 18 BRPM | TEMPERATURE: 98 F | DIASTOLIC BLOOD PRESSURE: 77 MMHG | OXYGEN SATURATION: 100 % | SYSTOLIC BLOOD PRESSURE: 135 MMHG

## 2022-12-08 DIAGNOSIS — Z98.89 OTHER SPECIFIED POSTPROCEDURAL STATES: Chronic | ICD-10-CM

## 2022-12-08 LAB
ALBUMIN SERPL ELPH-MCNC: 4.1 G/DL — SIGNIFICANT CHANGE UP (ref 3.3–5)
ALP SERPL-CCNC: 69 U/L — SIGNIFICANT CHANGE UP (ref 40–120)
ALT FLD-CCNC: 21 U/L — SIGNIFICANT CHANGE UP (ref 4–41)
ANION GAP SERPL CALC-SCNC: 12 MMOL/L — SIGNIFICANT CHANGE UP (ref 7–14)
AST SERPL-CCNC: 19 U/L — SIGNIFICANT CHANGE UP (ref 4–40)
BASOPHILS # BLD AUTO: 0.03 K/UL — SIGNIFICANT CHANGE UP (ref 0–0.2)
BASOPHILS NFR BLD AUTO: 0.4 % — SIGNIFICANT CHANGE UP (ref 0–2)
BILIRUB SERPL-MCNC: 0.4 MG/DL — SIGNIFICANT CHANGE UP (ref 0.2–1.2)
BUN SERPL-MCNC: 17 MG/DL — SIGNIFICANT CHANGE UP (ref 7–23)
CALCIUM SERPL-MCNC: 9.4 MG/DL — SIGNIFICANT CHANGE UP (ref 8.4–10.5)
CHLORIDE SERPL-SCNC: 102 MMOL/L — SIGNIFICANT CHANGE UP (ref 98–107)
CO2 SERPL-SCNC: 22 MMOL/L — SIGNIFICANT CHANGE UP (ref 22–31)
CREAT SERPL-MCNC: 0.88 MG/DL — SIGNIFICANT CHANGE UP (ref 0.5–1.3)
EGFR: 97 ML/MIN/1.73M2 — SIGNIFICANT CHANGE UP
EOSINOPHIL # BLD AUTO: 0.13 K/UL — SIGNIFICANT CHANGE UP (ref 0–0.5)
EOSINOPHIL NFR BLD AUTO: 1.6 % — SIGNIFICANT CHANGE UP (ref 0–6)
GLUCOSE SERPL-MCNC: 97 MG/DL — SIGNIFICANT CHANGE UP (ref 70–99)
HCT VFR BLD CALC: 42.1 % — SIGNIFICANT CHANGE UP (ref 39–50)
HGB BLD-MCNC: 13.8 G/DL — SIGNIFICANT CHANGE UP (ref 13–17)
IANC: 4.13 K/UL — SIGNIFICANT CHANGE UP (ref 1.8–7.4)
IMM GRANULOCYTES NFR BLD AUTO: 0.1 % — SIGNIFICANT CHANGE UP (ref 0–0.9)
LYMPHOCYTES # BLD AUTO: 2.98 K/UL — SIGNIFICANT CHANGE UP (ref 1–3.3)
LYMPHOCYTES # BLD AUTO: 37.8 % — SIGNIFICANT CHANGE UP (ref 13–44)
MAGNESIUM SERPL-MCNC: 2.4 MG/DL — SIGNIFICANT CHANGE UP (ref 1.6–2.6)
MCHC RBC-ENTMCNC: 28.7 PG — SIGNIFICANT CHANGE UP (ref 27–34)
MCHC RBC-ENTMCNC: 32.8 GM/DL — SIGNIFICANT CHANGE UP (ref 32–36)
MCV RBC AUTO: 87.5 FL — SIGNIFICANT CHANGE UP (ref 80–100)
MONOCYTES # BLD AUTO: 0.61 K/UL — SIGNIFICANT CHANGE UP (ref 0–0.9)
MONOCYTES NFR BLD AUTO: 7.7 % — SIGNIFICANT CHANGE UP (ref 2–14)
NEUTROPHILS # BLD AUTO: 4.13 K/UL — SIGNIFICANT CHANGE UP (ref 1.8–7.4)
NEUTROPHILS NFR BLD AUTO: 52.4 % — SIGNIFICANT CHANGE UP (ref 43–77)
NRBC # BLD: 0 /100 WBCS — SIGNIFICANT CHANGE UP (ref 0–0)
NRBC # FLD: 0 K/UL — SIGNIFICANT CHANGE UP (ref 0–0)
PHOSPHATE SERPL-MCNC: 3.5 MG/DL — SIGNIFICANT CHANGE UP (ref 2.5–4.5)
PLATELET # BLD AUTO: 279 K/UL — SIGNIFICANT CHANGE UP (ref 150–400)
POTASSIUM SERPL-MCNC: 3.9 MMOL/L — SIGNIFICANT CHANGE UP (ref 3.5–5.3)
POTASSIUM SERPL-SCNC: 3.9 MMOL/L — SIGNIFICANT CHANGE UP (ref 3.5–5.3)
PROT SERPL-MCNC: 6.6 G/DL — SIGNIFICANT CHANGE UP (ref 6–8.3)
RBC # BLD: 4.81 M/UL — SIGNIFICANT CHANGE UP (ref 4.2–5.8)
RBC # FLD: 15.1 % — HIGH (ref 10.3–14.5)
SODIUM SERPL-SCNC: 136 MMOL/L — SIGNIFICANT CHANGE UP (ref 135–145)
WBC # BLD: 7.89 K/UL — SIGNIFICANT CHANGE UP (ref 3.8–10.5)
WBC # FLD AUTO: 7.89 K/UL — SIGNIFICANT CHANGE UP (ref 3.8–10.5)

## 2022-12-08 PROCEDURE — 99284 EMERGENCY DEPT VISIT MOD MDM: CPT

## 2022-12-08 NOTE — ED PROVIDER NOTE - NSFOLLOWUPINSTRUCTIONS_ED_ALL_ED_FT
Rest, stay hydrated, take all meds as previously prescribed, Followup with your PMD within 2 days for post hospital visit. Show your doctor and specialists all copies of labs given to you. Return for worsening symptoms, ex. fever, shortness of breath, chest pain, dizziness, palpitations, etc. Please read all the patient handouts.  If no PMD, can call [facility] EM/IM clinic or [facility] Medicine clinic [phone] in [facility] for appointment.

## 2022-12-08 NOTE — ED PROVIDER NOTE - NSFOLLOWUPCLINICS_GEN_ALL_ED_FT
[facility]  Neurology  [address] [place] [address] - [address] [place] [address]  Phone: [phone]  Fax:   Follow Up Time: 4-6 Days    [facility]  Neurology  [address] [place] [address]  Phone: [phone]  Fax: [phone]  Follow Up Time: 4-6 Days

## 2022-12-08 NOTE — ED ADULT NURSE NOTE - OBJECTIVE STATEMENT
pt received to rm 28 , a&ox4 , ambulatory , pmh of diverticulitis , p/w facial numbness and tingling intermittently . Pt will present no further signs of neurologic deficits.  Pt breathing even and unlabored on room air. Denies fever, chills, cough, SOB, chest pain, palpitations, N/V/D, constipation,. Labs collected and sent.  pt educated on fall precautions and confirms understanding via teach back method. Stretcher locked in lowest position with siderails up x2. Call bell and personal items within reach.

## 2022-12-08 NOTE — ED ADULT TRIAGE NOTE - ESI TRIAGE ACUITY LEVEL, MLM
Outpatient Follow-Up - Palliative and Supportive Care   Jose Griffith 67 y o  female [id number]    Assessment & Plan  1  Nausea    2  Hypokalemia    3  Localized swelling of left foot    4  Type 2 diabetes mellitus without complication, without long-term current use of insulin (HCC)        Medications adjusted this encounter:  Requested Prescriptions     Signed Prescriptions Disp Refills    potassium chloride (K-DUR,KLOR-CON) 20 mEq tablet       Sig: Take 2 tablets (40 mEq total) by mouth daily as needed (low K+)    metoclopramide (REGLAN) 10 mg tablet 60 tablet 0     Sig: Take 0 5 tablets (5 mg total) by mouth 3 (three) times a day before meals As needed for nausea    furosemide (LASIX) 40 mg tablet       Sig: Take 1 tablet (40 mg total) by mouth as needed (leg swelling, fluid overload)    metFORMIN (FORTAMET) 500 MG (OSM) 24 hr tablet 120 tablet 0     Sig: Take 2 tablets (1,000 mg total) by mouth 2 (two) times a day with meals Take in place of old metformin     Medications Discontinued During This Encounter   Medication Reason    potassium chloride (K-DUR,KLOR-CON) 20 mEq tablet Reorder    furosemide (LASIX) 40 mg tablet Reorder        Ms Ty Hernandez was seen today for symptoms and planning cares related to above illnesses  I have reviewed the patient's controlled substance dispensing history in the Prescription Drug Monitoring Program in compliance with the [facility] regulations before prescribing any controlled substances  She is invited to continue to follow with us  If there are questions or concerns, please contact us through our clinic/answering service 24 hours a day, seven days a week  Rodrigo Rollins MD  [facility] Palliative and Supportive Care        Visit Information    Accompanied By: No one    Source of History: Self    History Limitations: None    Contacts:     Follow up visit for:  symptom management    History of Present Illness      This lady presents in f/up of her Stage IV lung cancer, with concomitant heart failure  Since last visit, she is continuing to struggle with nausea  Reglan never tried  Ondansetron is constipating, but helpful for nausea  No pain  Feeling tired, but staying active  Wt stable  Trial of metformin XR for nausea was discussed, since none of her other meds appear to be contributing  Past medical, surgical, social, and family histories are reviewed and pertinent updates are made  Review of Systems   Constitution: Positive for decreased appetite  Negative for weight gain and weight loss  HENT: Negative for hoarse voice and nosebleeds  Eyes: Negative for vision loss in left eye and vision loss in right eye  Cardiovascular: Negative for chest pain and dyspnea on exertion  Respiratory: Negative for cough and shortness of breath  Endocrine: Negative for polydipsia, polyphagia and polyuria  Skin: Negative for flushing and itching  Musculoskeletal: Negative for falls  Gastrointestinal: Positive for nausea  Negative for anorexia, jaundice and vomiting  Genitourinary: Negative for frequency  Neurological: Negative for dizziness  Psychiatric/Behavioral: Negative for depression and memory loss  The patient is not nervous/anxious  Vital Signs    /68 (BP Location: Right arm, Patient Position: Sitting, Cuff Size: Standard)   Pulse 72   Temp 99 °F (37 2 °C) (Oral)   Resp 16   LMP  (LMP Unknown)     Physical Exam and Objective Data  Physical Exam   Constitutional: She is oriented to person, place, and time  She appears well-developed  No distress  HENT:   Head: Normocephalic and atraumatic  Right Ear: External ear normal    Left Ear: External ear normal    Mouth/Throat: Oropharynx is clear and moist    Eyes: Pupils are equal, round, and reactive to light  Conjunctivae and EOM are normal  Right eye exhibits no discharge  Left eye exhibits no discharge  Neck: Normal range of motion  No tracheal deviation present  Cardiovascular: Normal rate and regular rhythm  Pulmonary/Chest: Effort normal  No stridor  No respiratory distress  She has wheezes  Abdominal: Soft  She exhibits no distension  Musculoskeletal: She exhibits no deformity  Neurological: She is alert and oriented to person, place, and time  No cranial nerve deficit  Skin: Skin is warm and dry  No rash noted  She is not diaphoretic  No erythema  There is pallor  Psychiatric: She has a normal mood and affect  Her behavior is normal  Judgment and thought content normal          Radiology and Laboratory:  I personally reviewed and interpreted the following results - nonne new    25+ minutes was spent face to face with Huang Casas with greater than 50% of the time spent in counseling or coordination of care including discussions of etiology of diagnosis, diagnostic results, impression, and recommendations, treatment instructions and patient and family counseling/involvement in care   All of the patient's questions were answered during this discussion  3

## 2022-12-08 NOTE — ED PROVIDER NOTE - ENMT, MLM
Airway patent, Nasal mucosa clear. Mouth with normal mucosa. Throat has no vesicles, no oropharyngeal exudates and uvula is midline. sensation intact b/l face v1-v3 distribution.

## 2022-12-08 NOTE — ED PROVIDER NOTE - PATIENT PORTAL LINK FT
You can access the FollowMyHealth Patient Portal offered by [facility] by registering at the following website: [email or web address]. By joining [facility]’s FollowMyHealth portal, you will also be able to view your health information using other applications (apps) compatible with our system.

## 2022-12-08 NOTE — ED ADULT TRIAGE NOTE - CHIEF COMPLAINT QUOTE
pt c/o b/l facial tingling/numbness x1 week, no sensation deficits noted. Denies traveling to chest/arms. Denies any pain or weakness

## 2022-12-08 NOTE — ED PROVIDER NOTE - OBJECTIVE STATEMENT
61 y/o M PMH diverticulitis and BP c/o mild upper back discomfort and intermittent tingling sensation to b/l sides of face. 63 y/o M PMH diverticulitis and BP c/o mild upper back discomfort and intermittent tingling sensation to b/l sides of face. Denies fever, chills, CP, SOB, vision changes, head/neck trauma, abdominal pain, N/V, numbness/tingling/weakness, bladder/bowel dysfunction.

## 2022-12-08 NOTE — ED PROVIDER NOTE - ADDITIONAL NOTES AND INSTRUCTIONS:
Please excuse from work/school as he/she was being evaluated in the Emergency Room at [facility].

## 2022-12-08 NOTE — ED PROVIDER NOTE - ATTENDING APP SHARED VISIT CONTRIBUTION OF CARE
I have personally performed a face to face medical and diagnostic evaluation of the patient. I have discussed with and reviewed the ARNOLD's note and agree with the History, ROS, Physical Exam and MDM unless otherwise indicated. A brief summary of my personal evaluation and impression can be found below.    Kelechi NANCE: 62-year-old gentleman with a history of diverticulitis presents with a chief complaint of mild upper back discomfort associated with intermittent episodes of bilateral face tingling that is persisting prompting ED visit tonight.  Patient notes has intermittent face tingling when he changes his neck position.  Has no radiating shoulder or arm discomfort.  Patient notes has been having intermittent flashes of floaters going on for the last couple months for which he is seeing his ophthalmologist and is being followed for this.  These symptoms were not acutely worse today.  Could otherwise move everything and feel everything no abdominal pain no bowel or bladder complaints no new lower extremity swelling or edema no recent trauma.  All other ROS negative, except as above and as per HPI and ROS section.    VITALS: Initial triage and subsequent vitals have been reviewed by me.  GEN APPEARANCE: WDWN, alert, non-toxic, NAD  HEAD: Atraumatic.  EYES: PERRLa, EOMI, vision grossly intact.   NECK: Supple  CV: RRR, S1S2, no c/r/m/g. Cap refill <2 seconds. No bruits.   LUNGS: CTAB. No abnormal breath sounds.  ABDOMEN: Soft, NTND. No guarding or rebound.   MSK/EXT: No spinal or extremity point tenderness. No CVA ttp. Pelvis stable. No peripheral edema.  NEURO: Alert, follows commands. Weight bearing normal. Speech normal. Sensation and motor normal x4 extremities. CN2-12 normal, coordination normal, ambulating normally.  UE & LE 5/5 b/l.  SKIN: Warm, dry and intact. No rash.  PSYCH: Appropriate    Plan/MDM: Kelechi NANCE: 62-year-old gentleman with a history of diverticulitis presents with a chief complaint of mild upper back discomfort associated with intermittent episodes of bilateral face tingling that is persisting prompting ED visit tonight.  Patient notes has intermittent face tingling when he changes his neck position.  Has no radiating shoulder or arm discomfort.  Patient notes has been having intermittent flashes of floaters going on for the last couple months for which he is seeing his ophthalmologist and is being followed for this.  The symptoms were not acutely worse today. Exam vital signs stable nontoxic and well-appearing neuro exam is grossly reassuring within normal limits.  Unclear etiology of patient's facial tingling have low concern for CVA exam is not consistent with infectious pathology as no fever and no rash have less concern for cervical radiculopathy as patient does not have any bilateral radiating upper extremity symptoms.  Plan to check basic labs and reassess consider discharge if work-up negative with close PMD and neurology follow-up.

## 2022-12-08 NOTE — ED PROVIDER NOTE - PROGRESS NOTE DETAILS
Patient reassessed at this time, asymptomatic at bedside.  Facial numbness has resolved.  Upon further clarification, facial numbness is described more as facial paresthesias, able to feel pressure over the face.  No facial droop or focal neurologic deficits.  No headaches or vision changes.  Otherwise well-appearing male without any acute distress.  Lab work reviewed and nonactionable at this time.  Patient struck to follow-up with PMD and neurologist for further evaluation.  Strict return precautions related patient.  All questions were answered.

## 2022-12-15 ENCOUNTER — APPOINTMENT (OUTPATIENT)
Dept: ORTHOPEDIC SURGERY | Facility: CLINIC | Age: 62
End: 2022-12-15

## 2022-12-15 VITALS — BODY MASS INDEX: 31.08 KG/M2 | WEIGHT: 198 LBS | HEIGHT: 67 IN

## 2022-12-15 PROCEDURE — 99072 ADDL SUPL MATRL&STAF TM PHE: CPT

## 2022-12-15 PROCEDURE — 99214 OFFICE O/P EST MOD 30 MIN: CPT

## 2022-12-15 NOTE — PHYSICAL EXAM
[Left] : left shoulder [Standing] : standing [Mild] : mild [5 ___] : forward flexion 5[unfilled]/5 [5___] : internal rotation 5[unfilled]/5 [] : negative drop-arm test [TWNoteComboBox7] : active forward flexion 130 degrees [de-identified] : active abduction 95 degrees [TWNoteComboBox6] : internal rotation L4 [de-identified] : external rotation 65 degrees

## 2022-12-15 NOTE — DISCUSSION/SUMMARY
[de-identified] : General Dx discussion\par The patient was advised of the diagnosis. The natural history of the pathology was explained in full to the patient in layman's terms. All questions were answered. The risks and benefits of surgical and non-surgical treatment alternatives were explained in full to the patient. \par \par Case Discussed.\par Pt has with pain and loss of motion, he has failed conservative care PT, injection and he cannot take NSAIDs due to gastritis. \par His surgery need to be approved as requested due to failure of conservative care. \par Would rec. PT at least to keep his motion.\par He is going to discussed with his  \par Continue work restrictions

## 2022-12-15 NOTE — REASON FOR VISIT
[FreeTextEntry2] : Patient is here for a Worker's Comp Follow Up appointment for the Left Shoulder. DOI: 5/11/22\par reports his surgery and PT have been denied

## 2023-01-26 ENCOUNTER — APPOINTMENT (OUTPATIENT)
Dept: ORTHOPEDIC SURGERY | Facility: CLINIC | Age: 63
End: 2023-01-26
Payer: OTHER MISCELLANEOUS

## 2023-01-26 VITALS — BODY MASS INDEX: 31.08 KG/M2 | HEIGHT: 67 IN | WEIGHT: 198 LBS

## 2023-01-26 PROCEDURE — 99214 OFFICE O/P EST MOD 30 MIN: CPT

## 2023-01-26 PROCEDURE — 73030 X-RAY EXAM OF SHOULDER: CPT | Mod: LT

## 2023-01-26 PROCEDURE — 99072 ADDL SUPL MATRL&STAF TM PHE: CPT

## 2023-01-26 NOTE — HISTORY OF PRESENT ILLNESS
[8] : 8 [2] : 2 [Sharp] : sharp [Full time] : Work status: full time [] : Post Surgical Visit: no [FreeTextEntry1] : left shoulder

## 2023-01-26 NOTE — PHYSICAL EXAM
[Left] : left shoulder [Standing] : standing [5 ___] : forward flexion 5[unfilled]/5 [5___] : internal rotation 5[unfilled]/5 [Moderate] : moderate [] : negative drop-arm test [TWNoteComboBox7] : active forward flexion 50 degrees [de-identified] : active abduction 45 degrees [TWNoteComboBox6] : internal rotation sacrum [de-identified] : external rotation 40 degrees
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
## 2023-01-26 NOTE — DISCUSSION/SUMMARY
[de-identified] : General Dx discussion\par The patient was advised of the diagnosis. The natural history of the pathology was explained in full to the patient in layman's terms. All questions were answered. The risks and benefits of surgical and non-surgical treatment alternatives were explained in full to the patient. \par \par

## 2023-02-09 ENCOUNTER — APPOINTMENT (OUTPATIENT)
Dept: ORTHOPEDIC SURGERY | Facility: CLINIC | Age: 63
End: 2023-02-09
Payer: OTHER MISCELLANEOUS

## 2023-02-09 ENCOUNTER — NON-APPOINTMENT (OUTPATIENT)
Age: 63
End: 2023-02-09

## 2023-02-09 VITALS — HEIGHT: 67 IN | WEIGHT: 200 LBS | BODY MASS INDEX: 31.39 KG/M2

## 2023-02-09 DIAGNOSIS — M51.36 OTHER INTERVERTEBRAL DISC DEGENERATION, LUMBAR REGION: ICD-10-CM

## 2023-02-09 PROCEDURE — 99214 OFFICE O/P EST MOD 30 MIN: CPT

## 2023-02-09 PROCEDURE — 99072 ADDL SUPL MATRL&STAF TM PHE: CPT

## 2023-02-11 ENCOUNTER — FORM ENCOUNTER (OUTPATIENT)
Age: 63
End: 2023-02-11

## 2023-02-12 NOTE — DISCUSSION/SUMMARY
[de-identified] : General Dx discussion\par The patient was advised of the diagnosis. The natural history of the pathology was explained in full to the patient in layman's terms. All questions were answered. The risks and benefits of surgical and non-surgical treatment alternatives were explained in full to the patient. \par \par Case Discussed.\par MRI reviewed, has with pain and loss of motion, GENEVIEVE has rec. surgery. \par Arthroscopy with decompression, debridement poss  RCR biceps tenodesis vs tenotomy  discussed\par Risks, benefits and alternatives discussed. Risks include, but are not limited to infection, blood clot, nerve damage, recurrent tear, loss of motion, continued pain, worsened pain, need for another surgery in the future. Recurrent tear discussed, poss biceps deformity discussed if tenotomy is performed. \par Discussed that the surgery will not address any pain that he has from any OA he may have. He understands he will not be 100% better after surgery. Prolonged immobilization and rehabilitation discussed. Work limitations discussed.\par Questions answered.\par He expressed understanding and would like to proceed. No work will Rx  PT \par \par The patient was advised of the diagnosis.  The natural history of the pathology was explained in full to the patient in layman's terms. All questions were answered.  The risks and benefits of surgical and non-surgical treatment alternatives were explained in full to the patient.  \par The patient demonstrated a full understanding of the surgical and non-surgical options.  The risks of surgery were outlined in full to the patient including but not limited to bleeding, scarring, infection, sepsis, neurologic injury, vascular injury, failure to resolve symptoms, symptom recurrence, the need for further surgery, non-healing, wound breakdown, deep vein thrombosis, pulmonary embolism, spontaneous osteonecrosis, anesthesia complications and even death.  The patient understood all the risks and accepted them and understood that other complications could occur that are not mentioned above.  The intraoperative plan, post-operative plan, post-operative expectations and limitations were explained in full.  Expectations from non-surgical treatment were explained in full as well.  The patient demonstrated a complete understanding of the treatment alternatives and requested the above-mentioned procedure.  This will be scheduled accordingly.\par \par \par \par \par \par Entered by Zoya VIEIRA acting as a scribe.\par Instructions: Dr. Mccarty- The documentation recorded by the scribe accurately reflects the service I personally performed and the decisions made by me.\par \par \par

## 2023-02-12 NOTE — PHYSICAL EXAM
[Left] : left shoulder [Standing] : standing [Moderate] : moderate [5 ___] : forward flexion 5[unfilled]/5 [5___] : internal rotation 5[unfilled]/5 [] : negative drop-arm test [TWNoteComboBox7] : active forward flexion 50 degrees [de-identified] : active abduction 45 degrees [TWNoteComboBox6] : internal rotation sacrum [de-identified] : external rotation 40 degrees

## 2023-02-12 NOTE — HISTORY OF PRESENT ILLNESS
[Work related] : work related [8] : 8 [2] : 2 [Sharp] : sharp [Frequent] : frequent [Sleep] : sleep [Full time] : Work status: full time [de-identified] : Patient is here for MRI results of his left shoulder. He continues to have pain. Reports he has seen an GENEVIEVE doctor who has rec. surgery.  [] : Post Surgical Visit: no [FreeTextEntry1] : left shoulder [FreeTextEntry3] : 1/25/23 [FreeTextEntry9] : Derrick Prabhakar [de-identified] : certain movements [de-identified] : none

## 2023-02-12 NOTE — DATA REVIEWED
[MRI] : MRI [Left] : left [Shoulder] : shoulder [Report was reviewed and noted in the chart] : The report was reviewed and noted in the chart [FreeTextEntry1] : moderate effusion in the GH joint. Partial tear in the supraspinatous tendon. Partial tear in the long head of the bicep tendong. Biciptial tenosynovitis. Superior Labral tear. Periarticular bursitis. Type II acromion.

## 2023-02-15 ENCOUNTER — FORM ENCOUNTER (OUTPATIENT)
Age: 63
End: 2023-02-15

## 2023-02-19 ENCOUNTER — FORM ENCOUNTER (OUTPATIENT)
Age: 63
End: 2023-02-19

## 2023-02-20 ENCOUNTER — FORM ENCOUNTER (OUTPATIENT)
Age: 63
End: 2023-02-20

## 2023-03-01 ENCOUNTER — APPOINTMENT (OUTPATIENT)
Age: 63
End: 2023-03-01
Payer: OTHER MISCELLANEOUS

## 2023-03-01 ENCOUNTER — NON-APPOINTMENT (OUTPATIENT)
Age: 63
End: 2023-03-01

## 2023-03-01 DIAGNOSIS — M75.112 INCOMPLETE ROTATOR CUFF TEAR OR RUPTURE OF LEFT SHOULDER, NOT SPECIFIED AS TRAUMATIC: ICD-10-CM

## 2023-03-01 PROCEDURE — 29826 SHO ARTHRS SRG DECOMPRESSION: CPT | Mod: LT

## 2023-03-01 PROCEDURE — 29823 SHO ARTHRS SRG XTNSV DBRDMT: CPT | Mod: AS,59,LT

## 2023-03-01 PROCEDURE — 29827 SHO ARTHRS SRG RT8TR CUF RPR: CPT | Mod: AS,LT

## 2023-03-01 PROCEDURE — 29823 SHO ARTHRS SRG XTNSV DBRDMT: CPT | Mod: 59,LT

## 2023-03-01 PROCEDURE — 29820 SHO ARTHRS SRG PRTL SYNVCT: CPT | Mod: AS,59,LT

## 2023-03-01 PROCEDURE — 23700 MNPJ ANES SHO JT FIXJ APRATS: CPT | Mod: AS,59,LT

## 2023-03-01 PROCEDURE — 29820 SHO ARTHRS SRG PRTL SYNVCT: CPT | Mod: 59,LT

## 2023-03-01 PROCEDURE — 23700 MNPJ ANES SHO JT FIXJ APRATS: CPT | Mod: 59,LT

## 2023-03-01 PROCEDURE — 29827 SHO ARTHRS SRG RT8TR CUF RPR: CPT | Mod: LT

## 2023-03-01 PROCEDURE — 29826 SHO ARTHRS SRG DECOMPRESSION: CPT | Mod: AS,LT

## 2023-03-01 RX ORDER — OXYCODONE AND ACETAMINOPHEN 5; 325 MG/1; MG/1
5-325 TABLET ORAL
Qty: 30 | Refills: 0 | Status: COMPLETED | COMMUNITY
Start: 2023-03-01 | End: 2023-03-06

## 2023-03-02 ENCOUNTER — NON-APPOINTMENT (OUTPATIENT)
Age: 63
End: 2023-03-02

## 2023-03-06 ENCOUNTER — FORM ENCOUNTER (OUTPATIENT)
Age: 63
End: 2023-03-06

## 2023-03-08 ENCOUNTER — TRANSCRIPTION ENCOUNTER (OUTPATIENT)
Age: 63
End: 2023-03-08

## 2023-03-09 ENCOUNTER — NON-APPOINTMENT (OUTPATIENT)
Age: 63
End: 2023-03-09

## 2023-03-09 ENCOUNTER — APPOINTMENT (OUTPATIENT)
Dept: ORTHOPEDIC SURGERY | Facility: CLINIC | Age: 63
End: 2023-03-09
Payer: OTHER MISCELLANEOUS

## 2023-03-09 VITALS — WEIGHT: 200 LBS | BODY MASS INDEX: 31.39 KG/M2 | HEIGHT: 67 IN

## 2023-03-09 PROCEDURE — 99024 POSTOP FOLLOW-UP VISIT: CPT

## 2023-03-09 NOTE — HISTORY OF PRESENT ILLNESS
[3] : 3 [0] : 0 [Dull/Aching] : dull/aching [Intermittent] : intermittent [Leisure] : leisure [Sleep] : sleep [Rest] : rest [] : Post Surgical Visit: yes [de-identified] : Here for f/u left shoulder s/p arthroscopy, synovectomy, decompression and rcr on 3/1/23. He is wearing immobilizer and doing ok.  [FreeTextEntry1] : left shoulder [FreeTextEntry5] : NAYAN is a 62 year old M who is here today for 1st PO of left shoulder. First couple of days were bad, he felt like his arm was paralyzed the first day,  but he has now feeling better. Only feels pain with movement.  [de-identified] : movement  [de-identified] : 3/1/23 [de-identified] : arthroscopy

## 2023-03-09 NOTE — DISCUSSION/SUMMARY
[de-identified] : Case Discussed.\par Surgical Pictures reviewed. \par Continue Immobilizer at all times. \par Will go for a course of physical therapy for PROM only. \par f/u 2 weeks.

## 2023-03-16 ENCOUNTER — APPOINTMENT (OUTPATIENT)
Dept: ORTHOPEDIC SURGERY | Facility: CLINIC | Age: 63
End: 2023-03-16

## 2023-03-23 ENCOUNTER — NON-APPOINTMENT (OUTPATIENT)
Age: 63
End: 2023-03-23

## 2023-03-23 ENCOUNTER — APPOINTMENT (OUTPATIENT)
Dept: ORTHOPEDIC SURGERY | Facility: CLINIC | Age: 63
End: 2023-03-23
Payer: OTHER MISCELLANEOUS

## 2023-03-23 VITALS — WEIGHT: 200 LBS | BODY MASS INDEX: 31.39 KG/M2 | HEIGHT: 67 IN

## 2023-03-23 PROCEDURE — 99024 POSTOP FOLLOW-UP VISIT: CPT

## 2023-03-23 PROCEDURE — L3660: CPT

## 2023-03-23 NOTE — WORK
[Total (100%)] : total (100%) [Cannot return to work because ________] : cannot return to work because [unfilled] [Unknown at this time] : : unknown at this time [Patient] : patient [I provided the services listed above] :  I provided the services listed above.

## 2023-03-23 NOTE — HISTORY OF PRESENT ILLNESS
[8] : 8 [0] : 0 [Intermittent] : intermittent [Sleep] : sleep [Ice] : ice [Heat] : heat [Not working due to injury] : Work status: not working due to injury [de-identified] : Patient is here for a post op visit for his left shoulder, DOS 3/1/23. [] : no [FreeTextEntry1] : Left shoulder [de-identified] : Physical therapy 1 x a week. [de-identified] : 3/1/23 [de-identified] : left shoulder arthroscopy

## 2023-03-23 NOTE — DISCUSSION/SUMMARY
[de-identified] : General Dx Discussion\par The patient was advised of the diagnosis. The natural history of the pathology was explained in full to the patient in layman's terms. All questions were answered. The risks and benefits of surgical and non-surgical treatment alternatives were explained in full to the patient.\par \par will advance passive rom no work f/u 2 weeks \par cont immobilizer

## 2023-03-23 NOTE — PHYSICAL EXAM
[Left] : left shoulder [] : shoulder immobilizer in place [TWNoteComboBox4] : passive forward flexion 80 degrees

## 2023-03-28 ENCOUNTER — FORM ENCOUNTER (OUTPATIENT)
Age: 63
End: 2023-03-28

## 2023-04-06 ENCOUNTER — NON-APPOINTMENT (OUTPATIENT)
Age: 63
End: 2023-04-06

## 2023-04-06 ENCOUNTER — APPOINTMENT (OUTPATIENT)
Dept: ORTHOPEDIC SURGERY | Facility: CLINIC | Age: 63
End: 2023-04-06
Payer: OTHER MISCELLANEOUS

## 2023-04-06 VITALS — WEIGHT: 200 LBS | BODY MASS INDEX: 31.39 KG/M2 | HEIGHT: 67 IN

## 2023-04-06 PROCEDURE — 99024 POSTOP FOLLOW-UP VISIT: CPT

## 2023-04-06 NOTE — HISTORY OF PRESENT ILLNESS
[Work related] : work related [7] : 7 [0] : 0 [Intermittent] : intermittent [Not working due to injury] : Work status: not working due to injury [de-identified] : Patient is here for a WC follow up on his left shoulder, DOI 1/25/23. [] : no [FreeTextEntry1] : Left shoulder [FreeTextEntry3] : 1/25/23 [FreeTextEntry6] : Pulsing [FreeTextEntry9] : Derrick Prabhakar [de-identified] : certain movements [de-identified] : 4 physical therapy visits

## 2023-04-06 NOTE — PHYSICAL EXAM
[Left] : left shoulder [] : shoulder immobilizer in place [TWNoteComboBox4] : passive forward flexion 115 degrees [TWNoteComboBox9] : passive abduction 100 degrees

## 2023-04-06 NOTE — DISCUSSION/SUMMARY
[de-identified] : General Dx Discussion\par The patient was advised of the diagnosis. The natural history of the pathology was explained in full to the patient in layman's terms. All questions were answered. The risks and benefits of surgical and non-surgical treatment alternatives were explained in full to the patient.\par \par will start active rom no weight lifting

## 2023-04-12 NOTE — ED ADULT NURSE NOTE - OBJECTIVE STATEMENT
no
Received pt in room 6, c/o generalized lower abdominal pain, "feels like my intestines are being squeezed."  Pt has history of B/L inguinal hernia repairs with mesh, was told by his PMD that he may have an obstruction or infection.  Pt A&Ox3, respirations even and unlabored.  Pt denies N/V/D, denies urinary symptoms.  Labs drawn and sent; IV access obtained.  Will continue to monitor.

## 2023-05-04 ENCOUNTER — APPOINTMENT (OUTPATIENT)
Dept: ORTHOPEDIC SURGERY | Facility: CLINIC | Age: 63
End: 2023-05-04
Payer: OTHER MISCELLANEOUS

## 2023-05-04 ENCOUNTER — NON-APPOINTMENT (OUTPATIENT)
Age: 63
End: 2023-05-04

## 2023-05-04 VITALS — BODY MASS INDEX: 31.39 KG/M2 | HEIGHT: 67 IN | WEIGHT: 200 LBS

## 2023-05-04 PROCEDURE — 99024 POSTOP FOLLOW-UP VISIT: CPT

## 2023-05-04 NOTE — DISCUSSION/SUMMARY
[de-identified] : General Dx Discussion\par The patient was advised of the diagnosis. The natural history of the pathology was explained in full to the patient in layman's terms. All questions were answered. The risks and benefits of surgical and non-surgical treatment alternatives were explained in full to the patient.\par \par cont PT no work

## 2023-05-04 NOTE — HISTORY OF PRESENT ILLNESS
[Work related] : work related [8] : 8 [3] : 3 [Sharp] : sharp [Throbbing] : throbbing [Intermittent] : intermittent [Household chores] : household chores [Leisure] : leisure [Sleep] : sleep [Rest] : rest [Ice] : ice [Heat] : heat [Not working due to injury] : Work status: not working due to injury [] : yes [de-identified] : Patient is here for a post op visit for his left shoulder, DOS 3/01/23. [FreeTextEntry3] : 1/25/23 [de-identified] : certain movements [de-identified] : Physical therapy 2 x a week. [de-identified] : 3/1/23 [de-identified] : left shoulder arthroscopy

## 2023-05-04 NOTE — PHYSICAL EXAM
[Left] : left shoulder [Standing] : standing [Mild] : mild [5 ___] : forward flexion 5[unfilled]/5 [5___] : internal rotation 5[unfilled]/5 [] : strength is improving [TWNoteComboBox7] : active forward flexion 120 degrees [TWNoteComboBox4] : False [de-identified] : active abduction 90 degrees [TWNoteComboBox9] : False [TWNoteComboBox6] : internal rotation L4 [de-identified] : external rotation 70 degrees

## 2023-05-07 ENCOUNTER — FORM ENCOUNTER (OUTPATIENT)
Age: 63
End: 2023-05-07

## 2023-06-01 ENCOUNTER — APPOINTMENT (OUTPATIENT)
Dept: ORTHOPEDIC SURGERY | Facility: CLINIC | Age: 63
End: 2023-06-01
Payer: OTHER MISCELLANEOUS

## 2023-06-01 ENCOUNTER — NON-APPOINTMENT (OUTPATIENT)
Age: 63
End: 2023-06-01

## 2023-06-01 VITALS — WEIGHT: 200 LBS | BODY MASS INDEX: 31.39 KG/M2 | HEIGHT: 67 IN

## 2023-06-01 DIAGNOSIS — M75.112 INCOMPLETE ROTATOR CUFF TEAR OR RUPTURE OF LEFT SHOULDER, NOT SPECIFIED AS TRAUMATIC: ICD-10-CM

## 2023-06-01 DIAGNOSIS — M19.012 PRIMARY OSTEOARTHRITIS, LEFT SHOULDER: ICD-10-CM

## 2023-06-01 PROCEDURE — 99213 OFFICE O/P EST LOW 20 MIN: CPT

## 2023-06-01 NOTE — HISTORY OF PRESENT ILLNESS
[8] : 8 [3] : 3 [Dull/Aching] : dull/aching [Sharp] : sharp [Shooting] : shooting [Stabbing] : stabbing [Not working due to injury] : Work status: not working due to injury [de-identified] : Patient is here for a post op visit for his left shoulder, DOS 3/01/23. He is going to therapy and doing well.  [] : This patient has had an injection before: no [FreeTextEntry1] : left shoulder  [de-identified] : pt  [de-identified] : 3/1/23

## 2023-06-01 NOTE — PHYSICAL EXAM
[Left] : left shoulder [Standing] : standing [Mild] : mild [5 ___] : forward flexion 5[unfilled]/5 [5___] : internal rotation 5[unfilled]/5 [] : no erythema [TWNoteComboBox7] : active forward flexion 140 degrees [de-identified] : active abduction 90 degrees [TWNoteComboBox6] : internal rotation L4 [de-identified] : external rotation 70 degrees

## 2023-06-01 NOTE — DISCUSSION/SUMMARY
[de-identified] : General Dx Discussion\par The patient was advised of the diagnosis. The natural history of the pathology was explained in full to the patient in layman's terms. All questions were answered. The risks and benefits of surgical and non-surgical treatment alternatives were explained in full to the patient.\par \par Case Discussed.\par Continue PT. \par No work at this time. \par f/u 4 weeks. \par \par Entered by Zoya VIEIRA acting as a scribe.\par Instructions: Dr. Mccarty- The documentation recorded by the scribe accurately reflects the service I personally performed and the decisions made by me.\par

## 2023-06-29 ENCOUNTER — NON-APPOINTMENT (OUTPATIENT)
Age: 63
End: 2023-06-29

## 2023-06-29 ENCOUNTER — APPOINTMENT (OUTPATIENT)
Dept: ORTHOPEDIC SURGERY | Facility: CLINIC | Age: 63
End: 2023-06-29
Payer: OTHER MISCELLANEOUS

## 2023-06-29 VITALS — HEIGHT: 67 IN | WEIGHT: 200 LBS | BODY MASS INDEX: 31.39 KG/M2

## 2023-06-29 DIAGNOSIS — M75.02 ADHESIVE CAPSULITIS OF LEFT SHOULDER: ICD-10-CM

## 2023-06-29 PROCEDURE — 99213 OFFICE O/P EST LOW 20 MIN: CPT

## 2023-06-30 NOTE — PHYSICAL EXAM
[Left] : left shoulder [Standing] : standing [Mild] : mild [5 ___] : forward flexion 5[unfilled]/5 [5___] : internal rotation 5[unfilled]/5 [] : no erythema [TWNoteComboBox7] : active forward flexion 150 degrees [de-identified] : active abduction 110 degrees [TWNoteComboBox6] : internal rotation L3 [de-identified] : external rotation 70 degrees

## 2023-06-30 NOTE — DISCUSSION/SUMMARY
[de-identified] : General Dx discussion\par The patient was advised of the diagnosis. The natural history of the pathology was explained in full to the patient in layman's terms. All questions were answered. The risks and benefits of surgical and non-surgical treatment alternatives were explained in full to the patient. 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 \par If not available then no work. \par Followup in 4 weeks. \par \par Entered by DARIEL Montes acting as scribe.\par -\par The documentation recorded by the scribe accurately reflects the service I personally performed and the decisions made by me.\par

## 2023-06-30 NOTE — WORK
[Partial] : partial [Can return to work with limitations on ______] : can return to work with limitations on [unfilled] [Patient] : patient [Unknown at this time] : : unknown at this time [No Rx restrictions] : No Rx restrictions. [I provided the services listed above] :  I provided the services listed above. 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

## 2023-06-30 NOTE — REASON FOR VISIT
[FreeTextEntry2] : Followup for his left shoulder. He has been going to PT once a week. Reports sharp pain with motion that lasts for about a second. Also reports new onset clicking.

## 2023-06-30 NOTE — HISTORY OF PRESENT ILLNESS
[5] : 5 [0] : 0 [Localized] : localized [Sharp] : sharp [Constant] : constant [] : no [FreeTextEntry1] : left shoulder  [FreeTextEntry6] : clicking  [de-identified] : PT

## 2023-07-05 ENCOUNTER — NON-APPOINTMENT (OUTPATIENT)
Age: 63
End: 2023-07-05

## 2023-07-28 ENCOUNTER — NON-APPOINTMENT (OUTPATIENT)
Age: 63
End: 2023-07-28

## 2023-07-28 ENCOUNTER — APPOINTMENT (OUTPATIENT)
Dept: ORTHOPEDIC SURGERY | Facility: CLINIC | Age: 63
End: 2023-07-28
Payer: OTHER MISCELLANEOUS

## 2023-07-28 VITALS — BODY MASS INDEX: 31.39 KG/M2 | HEIGHT: 67 IN | WEIGHT: 200 LBS

## 2023-07-28 PROCEDURE — 99214 OFFICE O/P EST MOD 30 MIN: CPT

## 2023-07-28 NOTE — PHYSICAL EXAM
[Left] : left shoulder [Standing] : standing [Mild] : mild [5 ___] : forward flexion 5[unfilled]/5 [5___] : internal rotation 5[unfilled]/5 [] : strength is improving [TWNoteComboBox7] : active forward flexion 165 degrees [de-identified] : active abduction 145 degrees [TWNoteComboBox6] : internal rotation L2 [de-identified] : external rotation 80 degrees

## 2023-07-28 NOTE — DISCUSSION/SUMMARY
[de-identified] : General Dx discussion\par The patient was advised of the diagnosis. The natural history of the pathology was explained in full to the patient in layman's terms. All questions were answered. The risks and benefits of surgical and non-surgical treatment alternatives were explained in full to the patient. \par \par Case discussed.\par starting his new position Monday 7/31/23 no lifting over 20 lbs \par \par cont HEP

## 2023-07-28 NOTE — WORK
[Partial] : partial [Can return to work with limitations on ______] : can return to work with limitations on [unfilled] [Unknown at this time] : : unknown at this time [Patient] : patient [No Rx restrictions] : No Rx restrictions. [I provided the services listed above] :  I provided the services listed above. 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

## 2023-07-28 NOTE — HISTORY OF PRESENT ILLNESS
[4] : 4 [0] : 0 [Localized] : localized [Sharp] : sharp [FreeTextEntry1] : left shoulder [FreeTextEntry6] : clicking

## 2023-08-17 NOTE — ED ADULT NURSE NOTE - CAS EDP DISCH TYPE
(RESOLVED)  Patient currently afebrile and with CBC wnl. D/C'd antibiotics as no longer needed. Home

## 2023-09-06 ENCOUNTER — NON-APPOINTMENT (OUTPATIENT)
Age: 63
End: 2023-09-06

## 2023-09-07 ENCOUNTER — APPOINTMENT (OUTPATIENT)
Dept: ORTHOPEDIC SURGERY | Facility: CLINIC | Age: 63
End: 2023-09-07
Payer: OTHER MISCELLANEOUS

## 2023-09-07 VITALS — BODY MASS INDEX: 31.39 KG/M2 | WEIGHT: 200 LBS | HEIGHT: 67 IN

## 2023-09-07 PROCEDURE — 99213 OFFICE O/P EST LOW 20 MIN: CPT

## 2023-09-07 NOTE — HISTORY OF PRESENT ILLNESS
[Work related] : work related [5] : 5 [0] : 0 [Intermittent] : intermittent [Heat] : heat [Full time] : Work status: full time [de-identified] : Patient is here for a WC follow up on his left shoulder. [] : no [FreeTextEntry3] : 1/25/23 [FreeTextEntry6] : spasm [FreeTextEntry9] : Derrick Prabhakar [de-identified] : certain movements [de-identified] : HEP

## 2023-09-07 NOTE — DISCUSSION/SUMMARY
[de-identified] : General Dx discussion The patient was advised of the diagnosis. The natural history of the pathology was explained in full to the patient in layman's terms. All questions were answered. The risks and benefits of surgical and non-surgical treatment alternatives were explained in full to the patient.   cont HEP  f/u 3 months

## 2023-09-07 NOTE — PHYSICAL EXAM
[Left] : left shoulder [Standing] : standing [Mild] : mild [5 ___] : forward flexion 5[unfilled]/5 [5___] : internal rotation 5[unfilled]/5 [] : negative drop-arm test [TWNoteComboBox7] : active forward flexion 165 degrees [de-identified] : active abduction 150 degrees [TWNoteComboBox6] : internal rotation L2 [de-identified] : external rotation 80 degrees

## 2023-11-04 ENCOUNTER — EMERGENCY (EMERGENCY)
Facility: HOSPITAL | Age: 63
LOS: 1 days | Discharge: HOME | End: 2023-11-04
Attending: EMERGENCY MEDICINE | Admitting: EMERGENCY MEDICINE
Payer: COMMERCIAL

## 2023-11-04 VITALS
HEART RATE: 60 BPM | DIASTOLIC BLOOD PRESSURE: 72 MMHG | SYSTOLIC BLOOD PRESSURE: 127 MMHG | RESPIRATION RATE: 17 BRPM | OXYGEN SATURATION: 100 % | TEMPERATURE: 98 F

## 2023-11-04 VITALS
TEMPERATURE: 98 F | RESPIRATION RATE: 16 BRPM | DIASTOLIC BLOOD PRESSURE: 59 MMHG | OXYGEN SATURATION: 100 % | HEART RATE: 76 BPM | SYSTOLIC BLOOD PRESSURE: 143 MMHG

## 2023-11-04 DIAGNOSIS — Z98.89 OTHER SPECIFIED POSTPROCEDURAL STATES: Chronic | ICD-10-CM

## 2023-11-04 LAB
ALBUMIN SERPL ELPH-MCNC: 4.1 G/DL — SIGNIFICANT CHANGE UP (ref 3.3–5)
ALBUMIN SERPL ELPH-MCNC: 4.1 G/DL — SIGNIFICANT CHANGE UP (ref 3.3–5)
ALP SERPL-CCNC: 64 U/L — SIGNIFICANT CHANGE UP (ref 40–120)
ALP SERPL-CCNC: 64 U/L — SIGNIFICANT CHANGE UP (ref 40–120)
ALT FLD-CCNC: 21 U/L — SIGNIFICANT CHANGE UP (ref 4–41)
ALT FLD-CCNC: 21 U/L — SIGNIFICANT CHANGE UP (ref 4–41)
ANION GAP SERPL CALC-SCNC: 10 MMOL/L — SIGNIFICANT CHANGE UP (ref 7–14)
ANION GAP SERPL CALC-SCNC: 10 MMOL/L — SIGNIFICANT CHANGE UP (ref 7–14)
APPEARANCE UR: CLEAR — SIGNIFICANT CHANGE UP
APPEARANCE UR: CLEAR — SIGNIFICANT CHANGE UP
APTT BLD: 34.3 SEC — SIGNIFICANT CHANGE UP (ref 24.5–35.6)
APTT BLD: 34.3 SEC — SIGNIFICANT CHANGE UP (ref 24.5–35.6)
AST SERPL-CCNC: 22 U/L — SIGNIFICANT CHANGE UP (ref 4–40)
AST SERPL-CCNC: 22 U/L — SIGNIFICANT CHANGE UP (ref 4–40)
BACTERIA # UR AUTO: NEGATIVE /HPF — SIGNIFICANT CHANGE UP
BACTERIA # UR AUTO: NEGATIVE /HPF — SIGNIFICANT CHANGE UP
BASE EXCESS BLDV CALC-SCNC: 0.5 MMOL/L — SIGNIFICANT CHANGE UP (ref -2–3)
BASE EXCESS BLDV CALC-SCNC: 0.5 MMOL/L — SIGNIFICANT CHANGE UP (ref -2–3)
BASOPHILS # BLD AUTO: 0.02 K/UL — SIGNIFICANT CHANGE UP (ref 0–0.2)
BASOPHILS # BLD AUTO: 0.02 K/UL — SIGNIFICANT CHANGE UP (ref 0–0.2)
BASOPHILS NFR BLD AUTO: 0.3 % — SIGNIFICANT CHANGE UP (ref 0–2)
BASOPHILS NFR BLD AUTO: 0.3 % — SIGNIFICANT CHANGE UP (ref 0–2)
BILIRUB SERPL-MCNC: 0.5 MG/DL — SIGNIFICANT CHANGE UP (ref 0.2–1.2)
BILIRUB SERPL-MCNC: 0.5 MG/DL — SIGNIFICANT CHANGE UP (ref 0.2–1.2)
BILIRUB UR-MCNC: NEGATIVE — SIGNIFICANT CHANGE UP
BILIRUB UR-MCNC: NEGATIVE — SIGNIFICANT CHANGE UP
BLOOD GAS VENOUS COMPREHENSIVE RESULT: SIGNIFICANT CHANGE UP
BLOOD GAS VENOUS COMPREHENSIVE RESULT: SIGNIFICANT CHANGE UP
BUN SERPL-MCNC: 10 MG/DL — SIGNIFICANT CHANGE UP (ref 7–23)
BUN SERPL-MCNC: 10 MG/DL — SIGNIFICANT CHANGE UP (ref 7–23)
CALCIUM SERPL-MCNC: 9.2 MG/DL — SIGNIFICANT CHANGE UP (ref 8.4–10.5)
CALCIUM SERPL-MCNC: 9.2 MG/DL — SIGNIFICANT CHANGE UP (ref 8.4–10.5)
CAST: 2 /LPF — SIGNIFICANT CHANGE UP (ref 0–4)
CAST: 2 /LPF — SIGNIFICANT CHANGE UP (ref 0–4)
CHLORIDE BLDV-SCNC: 105 MMOL/L — SIGNIFICANT CHANGE UP (ref 96–108)
CHLORIDE BLDV-SCNC: 105 MMOL/L — SIGNIFICANT CHANGE UP (ref 96–108)
CHLORIDE SERPL-SCNC: 105 MMOL/L — SIGNIFICANT CHANGE UP (ref 98–107)
CHLORIDE SERPL-SCNC: 105 MMOL/L — SIGNIFICANT CHANGE UP (ref 98–107)
CO2 BLDV-SCNC: 27.4 MMOL/L — HIGH (ref 22–26)
CO2 BLDV-SCNC: 27.4 MMOL/L — HIGH (ref 22–26)
CO2 SERPL-SCNC: 25 MMOL/L — SIGNIFICANT CHANGE UP (ref 22–31)
CO2 SERPL-SCNC: 25 MMOL/L — SIGNIFICANT CHANGE UP (ref 22–31)
COLOR SPEC: YELLOW — SIGNIFICANT CHANGE UP
COLOR SPEC: YELLOW — SIGNIFICANT CHANGE UP
CREAT SERPL-MCNC: 0.82 MG/DL — SIGNIFICANT CHANGE UP (ref 0.5–1.3)
CREAT SERPL-MCNC: 0.82 MG/DL — SIGNIFICANT CHANGE UP (ref 0.5–1.3)
DIFF PNL FLD: NEGATIVE — SIGNIFICANT CHANGE UP
DIFF PNL FLD: NEGATIVE — SIGNIFICANT CHANGE UP
EGFR: 99 ML/MIN/1.73M2 — SIGNIFICANT CHANGE UP
EGFR: 99 ML/MIN/1.73M2 — SIGNIFICANT CHANGE UP
EOSINOPHIL # BLD AUTO: 0.05 K/UL — SIGNIFICANT CHANGE UP (ref 0–0.5)
EOSINOPHIL # BLD AUTO: 0.05 K/UL — SIGNIFICANT CHANGE UP (ref 0–0.5)
EOSINOPHIL NFR BLD AUTO: 0.8 % — SIGNIFICANT CHANGE UP (ref 0–6)
EOSINOPHIL NFR BLD AUTO: 0.8 % — SIGNIFICANT CHANGE UP (ref 0–6)
GAS PNL BLDV: 138 MMOL/L — SIGNIFICANT CHANGE UP (ref 136–145)
GAS PNL BLDV: 138 MMOL/L — SIGNIFICANT CHANGE UP (ref 136–145)
GLUCOSE BLDV-MCNC: 90 MG/DL — SIGNIFICANT CHANGE UP (ref 70–99)
GLUCOSE BLDV-MCNC: 90 MG/DL — SIGNIFICANT CHANGE UP (ref 70–99)
GLUCOSE SERPL-MCNC: 98 MG/DL — SIGNIFICANT CHANGE UP (ref 70–99)
GLUCOSE SERPL-MCNC: 98 MG/DL — SIGNIFICANT CHANGE UP (ref 70–99)
GLUCOSE UR QL: NEGATIVE MG/DL — SIGNIFICANT CHANGE UP
GLUCOSE UR QL: NEGATIVE MG/DL — SIGNIFICANT CHANGE UP
HCO3 BLDV-SCNC: 26 MMOL/L — SIGNIFICANT CHANGE UP (ref 22–29)
HCO3 BLDV-SCNC: 26 MMOL/L — SIGNIFICANT CHANGE UP (ref 22–29)
HCT VFR BLD CALC: 39.3 % — SIGNIFICANT CHANGE UP (ref 39–50)
HCT VFR BLD CALC: 39.3 % — SIGNIFICANT CHANGE UP (ref 39–50)
HCT VFR BLDA CALC: 40 % — SIGNIFICANT CHANGE UP (ref 39–51)
HCT VFR BLDA CALC: 40 % — SIGNIFICANT CHANGE UP (ref 39–51)
HGB BLD CALC-MCNC: 13.2 G/DL — SIGNIFICANT CHANGE UP (ref 12.6–17.4)
HGB BLD CALC-MCNC: 13.2 G/DL — SIGNIFICANT CHANGE UP (ref 12.6–17.4)
HGB BLD-MCNC: 13.4 G/DL — SIGNIFICANT CHANGE UP (ref 13–17)
HGB BLD-MCNC: 13.4 G/DL — SIGNIFICANT CHANGE UP (ref 13–17)
IANC: 3.5 K/UL — SIGNIFICANT CHANGE UP (ref 1.8–7.4)
IANC: 3.5 K/UL — SIGNIFICANT CHANGE UP (ref 1.8–7.4)
IMM GRANULOCYTES NFR BLD AUTO: 0.2 % — SIGNIFICANT CHANGE UP (ref 0–0.9)
IMM GRANULOCYTES NFR BLD AUTO: 0.2 % — SIGNIFICANT CHANGE UP (ref 0–0.9)
INR BLD: 1.01 RATIO — SIGNIFICANT CHANGE UP (ref 0.85–1.18)
INR BLD: 1.01 RATIO — SIGNIFICANT CHANGE UP (ref 0.85–1.18)
KETONES UR-MCNC: 40 MG/DL
KETONES UR-MCNC: 40 MG/DL
LACTATE BLDV-MCNC: 1 MMOL/L — SIGNIFICANT CHANGE UP (ref 0.5–2)
LACTATE BLDV-MCNC: 1 MMOL/L — SIGNIFICANT CHANGE UP (ref 0.5–2)
LEUKOCYTE ESTERASE UR-ACNC: NEGATIVE — SIGNIFICANT CHANGE UP
LEUKOCYTE ESTERASE UR-ACNC: NEGATIVE — SIGNIFICANT CHANGE UP
LIDOCAIN IGE QN: 15 U/L — SIGNIFICANT CHANGE UP (ref 7–60)
LIDOCAIN IGE QN: 15 U/L — SIGNIFICANT CHANGE UP (ref 7–60)
LYMPHOCYTES # BLD AUTO: 1.98 K/UL — SIGNIFICANT CHANGE UP (ref 1–3.3)
LYMPHOCYTES # BLD AUTO: 1.98 K/UL — SIGNIFICANT CHANGE UP (ref 1–3.3)
LYMPHOCYTES # BLD AUTO: 33.4 % — SIGNIFICANT CHANGE UP (ref 13–44)
LYMPHOCYTES # BLD AUTO: 33.4 % — SIGNIFICANT CHANGE UP (ref 13–44)
MAGNESIUM SERPL-MCNC: 2.3 MG/DL — SIGNIFICANT CHANGE UP (ref 1.6–2.6)
MAGNESIUM SERPL-MCNC: 2.3 MG/DL — SIGNIFICANT CHANGE UP (ref 1.6–2.6)
MCHC RBC-ENTMCNC: 29.1 PG — SIGNIFICANT CHANGE UP (ref 27–34)
MCHC RBC-ENTMCNC: 29.1 PG — SIGNIFICANT CHANGE UP (ref 27–34)
MCHC RBC-ENTMCNC: 34.1 GM/DL — SIGNIFICANT CHANGE UP (ref 32–36)
MCHC RBC-ENTMCNC: 34.1 GM/DL — SIGNIFICANT CHANGE UP (ref 32–36)
MCV RBC AUTO: 85.2 FL — SIGNIFICANT CHANGE UP (ref 80–100)
MCV RBC AUTO: 85.2 FL — SIGNIFICANT CHANGE UP (ref 80–100)
MONOCYTES # BLD AUTO: 0.36 K/UL — SIGNIFICANT CHANGE UP (ref 0–0.9)
MONOCYTES # BLD AUTO: 0.36 K/UL — SIGNIFICANT CHANGE UP (ref 0–0.9)
MONOCYTES NFR BLD AUTO: 6.1 % — SIGNIFICANT CHANGE UP (ref 2–14)
MONOCYTES NFR BLD AUTO: 6.1 % — SIGNIFICANT CHANGE UP (ref 2–14)
NEUTROPHILS # BLD AUTO: 3.5 K/UL — SIGNIFICANT CHANGE UP (ref 1.8–7.4)
NEUTROPHILS # BLD AUTO: 3.5 K/UL — SIGNIFICANT CHANGE UP (ref 1.8–7.4)
NEUTROPHILS NFR BLD AUTO: 59.2 % — SIGNIFICANT CHANGE UP (ref 43–77)
NEUTROPHILS NFR BLD AUTO: 59.2 % — SIGNIFICANT CHANGE UP (ref 43–77)
NITRITE UR-MCNC: NEGATIVE — SIGNIFICANT CHANGE UP
NITRITE UR-MCNC: NEGATIVE — SIGNIFICANT CHANGE UP
NRBC # BLD: 0 /100 WBCS — SIGNIFICANT CHANGE UP (ref 0–0)
NRBC # BLD: 0 /100 WBCS — SIGNIFICANT CHANGE UP (ref 0–0)
NRBC # FLD: 0 K/UL — SIGNIFICANT CHANGE UP (ref 0–0)
NRBC # FLD: 0 K/UL — SIGNIFICANT CHANGE UP (ref 0–0)
PCO2 BLDV: 44 MMHG — SIGNIFICANT CHANGE UP (ref 42–55)
PCO2 BLDV: 44 MMHG — SIGNIFICANT CHANGE UP (ref 42–55)
PH BLDV: 7.38 — SIGNIFICANT CHANGE UP (ref 7.32–7.43)
PH BLDV: 7.38 — SIGNIFICANT CHANGE UP (ref 7.32–7.43)
PH UR: 6.5 — SIGNIFICANT CHANGE UP (ref 5–8)
PH UR: 6.5 — SIGNIFICANT CHANGE UP (ref 5–8)
PLATELET # BLD AUTO: 247 K/UL — SIGNIFICANT CHANGE UP (ref 150–400)
PLATELET # BLD AUTO: 247 K/UL — SIGNIFICANT CHANGE UP (ref 150–400)
PO2 BLDV: 63 MMHG — HIGH (ref 25–45)
PO2 BLDV: 63 MMHG — HIGH (ref 25–45)
POTASSIUM BLDV-SCNC: 3.5 MMOL/L — SIGNIFICANT CHANGE UP (ref 3.5–5.1)
POTASSIUM BLDV-SCNC: 3.5 MMOL/L — SIGNIFICANT CHANGE UP (ref 3.5–5.1)
POTASSIUM SERPL-MCNC: 3.8 MMOL/L — SIGNIFICANT CHANGE UP (ref 3.5–5.3)
POTASSIUM SERPL-MCNC: 3.8 MMOL/L — SIGNIFICANT CHANGE UP (ref 3.5–5.3)
POTASSIUM SERPL-SCNC: 3.8 MMOL/L — SIGNIFICANT CHANGE UP (ref 3.5–5.3)
POTASSIUM SERPL-SCNC: 3.8 MMOL/L — SIGNIFICANT CHANGE UP (ref 3.5–5.3)
PROT SERPL-MCNC: 6 G/DL — SIGNIFICANT CHANGE UP (ref 6–8.3)
PROT SERPL-MCNC: 6 G/DL — SIGNIFICANT CHANGE UP (ref 6–8.3)
PROT UR-MCNC: NEGATIVE MG/DL — SIGNIFICANT CHANGE UP
PROT UR-MCNC: NEGATIVE MG/DL — SIGNIFICANT CHANGE UP
PROTHROM AB SERPL-ACNC: 11.4 SEC — SIGNIFICANT CHANGE UP (ref 9.5–13)
PROTHROM AB SERPL-ACNC: 11.4 SEC — SIGNIFICANT CHANGE UP (ref 9.5–13)
RBC # BLD: 4.61 M/UL — SIGNIFICANT CHANGE UP (ref 4.2–5.8)
RBC # BLD: 4.61 M/UL — SIGNIFICANT CHANGE UP (ref 4.2–5.8)
RBC # FLD: 14.5 % — SIGNIFICANT CHANGE UP (ref 10.3–14.5)
RBC # FLD: 14.5 % — SIGNIFICANT CHANGE UP (ref 10.3–14.5)
RBC CASTS # UR COMP ASSIST: 0 /HPF — SIGNIFICANT CHANGE UP (ref 0–4)
RBC CASTS # UR COMP ASSIST: 0 /HPF — SIGNIFICANT CHANGE UP (ref 0–4)
SAO2 % BLDV: 92.8 % — HIGH (ref 67–88)
SAO2 % BLDV: 92.8 % — HIGH (ref 67–88)
SODIUM SERPL-SCNC: 140 MMOL/L — SIGNIFICANT CHANGE UP (ref 135–145)
SODIUM SERPL-SCNC: 140 MMOL/L — SIGNIFICANT CHANGE UP (ref 135–145)
SP GR SPEC: 1.01 — SIGNIFICANT CHANGE UP (ref 1–1.03)
SP GR SPEC: 1.01 — SIGNIFICANT CHANGE UP (ref 1–1.03)
SQUAMOUS # UR AUTO: 0 /HPF — SIGNIFICANT CHANGE UP (ref 0–5)
SQUAMOUS # UR AUTO: 0 /HPF — SIGNIFICANT CHANGE UP (ref 0–5)
UROBILINOGEN FLD QL: 0.2 MG/DL — SIGNIFICANT CHANGE UP (ref 0.2–1)
UROBILINOGEN FLD QL: 0.2 MG/DL — SIGNIFICANT CHANGE UP (ref 0.2–1)
WBC # BLD: 5.92 K/UL — SIGNIFICANT CHANGE UP (ref 3.8–10.5)
WBC # BLD: 5.92 K/UL — SIGNIFICANT CHANGE UP (ref 3.8–10.5)
WBC # FLD AUTO: 5.92 K/UL — SIGNIFICANT CHANGE UP (ref 3.8–10.5)
WBC # FLD AUTO: 5.92 K/UL — SIGNIFICANT CHANGE UP (ref 3.8–10.5)
WBC UR QL: 0 /HPF — SIGNIFICANT CHANGE UP (ref 0–5)
WBC UR QL: 0 /HPF — SIGNIFICANT CHANGE UP (ref 0–5)

## 2023-11-04 PROCEDURE — 74177 CT ABD & PELVIS W/CONTRAST: CPT | Mod: 26,MA

## 2023-11-04 PROCEDURE — 99285 EMERGENCY DEPT VISIT HI MDM: CPT

## 2023-11-04 RX ORDER — ACETAMINOPHEN 500 MG
1000 TABLET ORAL ONCE
Refills: 0 | Status: COMPLETED | OUTPATIENT
Start: 2023-11-04 | End: 2023-11-04

## 2023-11-04 RX ADMIN — Medication 400 MILLIGRAM(S): at 06:15

## 2023-11-04 RX ADMIN — Medication 1000 MILLIGRAM(S): at 08:42

## 2023-11-04 NOTE — ED ADULT NURSE NOTE - OBJECTIVE STATEMENT
Break Coverage RN: Pt A&Ox4 ambulatory, PMH bilateral inguinal hernia status post mesh repair 2013, diverticulitis, gastritis, BPH, presenting to the ED (RM 11) c/o left lower abdominal since Tuesday. Pt states has been experiencing left lower abdominal pain radiating to groin area associated with dysuria and constipation. Pt states had a small BM yesterday. Pt states "I feel like I still need to go but I cannot". Pt denies any other complaints. Respirations are even and unlabored, NAD, denies any other complaints. 20G IV LAC, labs sent, UA/UC sent, medicated as per orders. Safety precautions implemented as per protocol, awaiting further MD orders, plan of care ongoing. Report endorses to Primary RN Kamryn.

## 2023-11-04 NOTE — ED PROVIDER NOTE - PROGRESS NOTE DETAILS
Anatoly PGY3: patient is updated of lab and imaging finding. Patient is reassessed. Patient appears comfortable. No abdominal tenderness on palpation. Patient reports that he has been constipated for the past few days. Been trying colace. Patient has known hx of hydrocele on R testicle. Denies testicular pain, penile discharge.

## 2023-11-04 NOTE — ED ADULT TRIAGE NOTE - BIRTH SEX
I attest my time as attending is greater than 50% of the total combined time spent on qualifying patient care activities by the PA/NP and attending.
Male
I attest my time as attending is greater than 50% of the total combined time spent on qualifying patient care activities by the PA/NP and attending.

## 2023-11-04 NOTE — ED PROVIDER NOTE - CLINICAL SUMMARY MEDICAL DECISION MAKING FREE TEXT BOX
63-year-old male pmhx of inguinal hernia status post mesh repair, diverticulitis, gastritis, BPH comes to ED w/ left lower quadrant abdominal pain radiating to the groin, constipation, dysuria. Started randomly.  Symptoms started 2 to 3 days ago. Their pain/symptom is moderate, intermittent, non mediating with rest. Denies trauma, falls, fever, headache, LOC, Head Trauma, AMS, dizziness, syncope, shortness of breath, cough, rhinorrhea, congestion, chest pain, palpitations, nausea, vomiting, diarrhea, melena, hematochezia, hematuria, urinary frequency, flank pain, skin changes, p.o. issues, issues urinating, issues stooling, issues with ambulation, back pain.    Meds/blood thinner use: None    Pharmacy: [facility]    PCP Dr. Edmond De La Fuente, Private    General: non-toxic, NAD   HEENT: NCAT, PERRL   Cardiac: RRR, no murmurs, 2+ peripheral pulses   Chest: CTAB  Abdomen: Milld LLQ TTP, no rebound or guarding. soft, non-distended, bowel sounds present.  : enlarged testicles, no tenderness to palpation, cremasteric reflex intact. No rashes or erythema.  Extremities: no peripheral edema, calf tenderness, or leg size discrepancies   Skin: no rashes   Neuro: AAOx4, 5+motor, sensory grossly intact   Psych: mood and affect appropriate    Impression: 63-year-old male pmhx of inguinal hernia status post mesh repair, diverticulitis, gastritis, BPH comes to ED w/ left lower quadrant abdominal pain radiating to the groin, constipation, dysuria. Their symptoms and exam findings are concerning for diverticulitis, UTI, sequela of hernia repair.    Ordered labs, imaging, medications for diagnosis, management, and treatment. 63-year-old male pmhx of bilateral inguinal hernia status post mesh repair 2013, diverticulitis, gastritis, BPH comes to ED w/ left lower quadrant abdominal pain radiating to the groin, constipation, dysuria. Started randomly.  Symptoms started 2 to 3 days ago. Their pain/symptom is moderate, intermittent, non mediating with rest. Denies trauma, falls, fever, headache, LOC, Head Trauma, AMS, dizziness, syncope, shortness of breath, cough, rhinorrhea, congestion, chest pain, palpitations, nausea, vomiting, diarrhea, melena, hematochezia, hematuria, urinary frequency, flank pain, skin changes, p.o. issues, issues urinating, issues stooling, issues with ambulation, back pain.    Meds/blood thinner use: None    Pharmacy: [facility]    PCP Dr. Edmond De La Fuente, Private    General: non-toxic, NAD   HEENT: NCAT, PERRL   Cardiac: RRR, no murmurs, 2+ peripheral pulses   Chest: CTAB  Abdomen: Milld LLQ TTP, no rebound or guarding. soft, non-distended, bowel sounds present.  : enlarged testicles, no tenderness to palpation, cremasteric reflex intact. No rashes or erythema.  Extremities: no peripheral edema, calf tenderness, or leg size discrepancies   Skin: no rashes   Neuro: AAOx4, 5+motor, sensory grossly intact   Psych: mood and affect appropriate    Impression: 63-year-old male pmhx of bilateral inguinal hernia status post mesh repair 2013, diverticulitis, gastritis, BPH comes to ED w/ left lower quadrant abdominal pain radiating to the groin, constipation, dysuria. Their symptoms and exam findings are concerning for diverticulitis, UTI, sequela of hernia repair.    Ordered labs, imaging, medications for diagnosis, management, and treatment.

## 2023-11-04 NOTE — ED PROVIDER NOTE - PATIENT PORTAL LINK FT
You can access the FollowMyHealth Patient Portal offered by [facility] by registering at the following website: [email or web address]. By joining [facility]’s FollowMyHealth portal, you will also be able to view your health information using other applications (apps) compatible with our system.

## 2023-11-04 NOTE — ED PROVIDER NOTE - ATTENDING CONTRIBUTION TO CARE
I performed a face-to-face evaluation of the patient and performed a history and physical examination along with the resident or ACP, and/or medical student above.  I agree with the history and physical examination as documented by the resident or ACP, and/or medical student above.  [place]:  General: non-toxic, NAD   HEENT: NCAT, PERRL   Cardiac: RRR, no murmurs, 2+ peripheral pulses   Chest: CTAB  Abdomen: Milld LLQ TTP, no rebound or guarding. soft, non-distended, bowel sounds present.  : enlarged testicles, no tenderness to palpation, cremasteric reflex intact. No rashes or erythema.  Extremities: no peripheral edema, calf tenderness, or leg size discrepancies   Skin: no rashes   Neuro: AAOx4, 5+motor, sensory grossly intact   Psych: mood and affect appropriate

## 2023-11-04 NOTE — ED PROVIDER NOTE - IV ALTEPLASE DOOR HIDDEN
Care Due:                  Date            Visit Type   Department     Provider  --------------------------------------------------------------------------------                                             [facility]                                           MED/ INTERNAL  Feliciano Castellano  Last Visit: 10-      None         MED/ PEDS      Ehrensing  Next Visit: None Scheduled  None         None Found                                                            Last  Test          Frequency    Reason                     Performed    Due Date  --------------------------------------------------------------------------------    Office Visit  12 months..  acyclovir, atorvastatin,   10-   10-                             hydroCHLOROthiazide,                             omeprazole...............    CBC.........  12 months..  acyclovir................  02- 02-    CMP.........  12 months..  acyclovir, atorvastatin,   Not Found    Overdue                             hydroCHLOROthiazide......    Lipid Panel.  12 months..  atorvastatin.............  Not Found    Overdue    Powered by [employer]. Reference number: [id number].   1/17/2022 10:51:38 AM CST   show

## 2023-11-04 NOTE — ED PROVIDER NOTE - NSFOLLOWUPINSTRUCTIONS_ED_ALL_ED_FT
You came to the Emergency Room because of abdominal pain.     Your lab work and CT scan results are unremarkable. Your symptom is likely due to constipation. We do not think that you have any emergent medical condition that requires urgent intervention or hospital admission at this time.     You may take OTC Tylenol 1000 mg every 8 hours (no more than 4000 mg per 24 hours) as needed for pain.   You may take OTC Senna two tablets twice daily as needed for constipation.   You may take OTC milk of magnesia 15 mL twice daily as needed for constipation.     Please follow up with your Primary Medical Doctor or Gastroenterologist in the clinic within the next 7 days to monitor your symptoms.     Please come back to the Emergency Room if your symptoms get worse.            Constipation    WHAT YOU NEED TO KNOW:    Constipation is when you have hard, dry bowel movements, or you go longer than usual between bowel movements.    DISCHARGE INSTRUCTIONS:    Call your doctor if:    You have blood in your bowel movements.    You have a fever and abdominal pain with the constipation.    Your constipation gets worse.    You start to vomit.    You have questions or concerns about your condition or care.  Medicines:    Medicine such as a laxative may help relax and loosen your intestines to help you have a bowel movement. Your provider may recommend you only use laxatives for a short time. Long-term use may make your bowels dependent on the medicine.    Take your medicine as directed. Contact your healthcare provider if you think your medicine is not helping or if you have side effects. Tell him of her if you are allergic to any medicine. Keep a list of the medicines, vitamins, and herbs you take. Include the amounts, and when and why you take them. Bring the list or the pill bottles to follow-up visits. Carry your medicine list with you in case of an emergency.  Relieve constipation:    A suppository may be used to help soften your bowel movements. This may make them easier to pass. A suppository is guided into your rectum through your anus.  Suppository for Constipation      An enema is liquid medicine used to clear bowel movement from your rectum. The medicine is put into your rectum through your anus.  Enemas  Prevent constipation:    Drink liquids as directed. You may need to drink extra liquids to help soften and move your bowels. Ask how much liquid to drink each day and which liquids are best for you.    Eat high-fiber foods. This may help decrease constipation by adding bulk to your bowel movements. High-fiber foods include fruit, vegetables, whole-grain breads and cereals, and beans. Your healthcare provider or dietitian can help you create a high-fiber meal plan. Your provider may also recommend a fiber supplement if you cannot get enough fiber from food.        Exercise regularly. Regular physical activity can help stimulate your intestines. Walking is a good exercise to prevent or relieve constipation. Ask which exercises are best for you.  Walking for Exercise      Schedule a time each day to have a bowel movement. This may help train your body to have regular bowel movements. Bend forward while you are on the toilet to help move the bowel movement out. Sit on the toilet for at least 10 minutes, even if you do not have a bowel movement.    Talk to your healthcare provider about your medicines. Certain medicines, such as opioids, can cause constipation. Your provider may be able to make medicine changes. For example, he or she may change the kind of medicine, or change when you take it.  Follow up with your healthcare provider as directed: Write down your questions so you remember to ask them during your visits.

## 2023-11-04 NOTE — ED ADULT NURSE NOTE - NSFALLUNIVINTERV_ED_ALL_ED
Bed/Stretcher in lowest position, wheels locked, appropriate side rails in place/Call bell, personal items and telephone in reach/Instruct patient to call for assistance before getting out of bed/chair/stretcher/Non-slip footwear applied when patient is off stretcher/[place] to call system/Physically safe environment - no spills, clutter or unnecessary equipment/Purposeful proactive rounding/Room/bathroom lighting operational, light cord in reach

## 2023-11-04 NOTE — ED ADULT TRIAGE NOTE - CHIEF COMPLAINT QUOTE
Pt c/o LLQ abdominal pain radiating to groin and constipation x few days. Denies urinary symptoms, nausea or vomiting. PMHx diverticulitis, gastritis, enlarged prostate

## 2023-11-05 LAB
CULTURE RESULTS: NO GROWTH — SIGNIFICANT CHANGE UP
CULTURE RESULTS: NO GROWTH — SIGNIFICANT CHANGE UP
SPECIMEN SOURCE: SIGNIFICANT CHANGE UP
SPECIMEN SOURCE: SIGNIFICANT CHANGE UP

## 2023-12-07 ENCOUNTER — APPOINTMENT (OUTPATIENT)
Dept: ORTHOPEDIC SURGERY | Facility: CLINIC | Age: 63
End: 2023-12-07

## 2023-12-15 ENCOUNTER — APPOINTMENT (OUTPATIENT)
Dept: ORTHOPEDIC SURGERY | Facility: CLINIC | Age: 63
End: 2023-12-15
Payer: OTHER MISCELLANEOUS

## 2023-12-15 VITALS — WEIGHT: 200 LBS | BODY MASS INDEX: 31.39 KG/M2 | HEIGHT: 67 IN

## 2023-12-15 PROCEDURE — 99214 OFFICE O/P EST MOD 30 MIN: CPT

## 2023-12-15 RX ORDER — LIDOCAINE 5% 700 MG/1
5 PATCH TOPICAL
Qty: 1 | Refills: 0 | Status: ACTIVE | COMMUNITY
Start: 2023-12-15 | End: 1900-01-01

## 2023-12-15 NOTE — PHYSICAL EXAM
[Left] : left shoulder [Standing] : standing [Mild] : mild [5 ___] : forward flexion 5[unfilled]/5 [5___] : internal rotation 5[unfilled]/5 [] : negative drop-arm test [TWNoteComboBox7] : active forward flexion 165 degrees [de-identified] : active abduction 150 degrees [TWNoteComboBox6] : internal rotation L2 [de-identified] : external rotation 80 degrees

## 2023-12-15 NOTE — HISTORY OF PRESENT ILLNESS
[Work related] : work related [4] : 4 [0] : 0 [Intermittent] : intermittent [Heat] : heat [Full time] : Work status: full time [de-identified] : Patient is here for a WC follow up on his left shoulder. 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

## 2023-12-15 NOTE — WORK
Requested Prescriptions     Signed Prescriptions Disp Refills    glatiramer (COPAXONE) 40 MG/ML injection 12 mL 11     Sig: Inject 1 mL into the skin three times a week     Authorizing Provider: JAMAL Dolan    Atogepant (QULIPTA) 30 MG TABS 30 tablet 11     Sig: Take 30 mg by mouth daily     Authorizing Provider: Tsering Kim [Partial] : partial [Can return to work with limitations on ______] : can return to work with limitations on [unfilled] [Unknown at this time] : : unknown at this time [Patient] : patient [No Rx restrictions] : No Rx restrictions. [I provided the services listed above] :  I provided the services listed above. 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

## 2023-12-15 NOTE — DISCUSSION/SUMMARY
[de-identified] : General Dx discussion The patient was advised of the diagnosis. The natural history of the pathology was explained in full to the patient in layman's terms. All questions were answered. The risks and benefits of surgical and non-surgical treatment alternatives were explained in full to the patient.   cont HEP  f/u 3 months  will try a lidocaine patch

## 2023-12-20 NOTE — ED ADULT NURSE NOTE - NS TRANSFER PATIENT BELONGINGS
[FreeTextEntry1] : A/P:  *symptomatic PVCs -pt noticing increase in frequency of PVCs much more than during prior episodes.  -epatch for 3 days to reassess PVC burden. -labs: TSH, CBC, comp - gave pt my cell to review results on Fri. -telehealth in 1 week to review results of epatch. -Echo & EST ordered will schedule in 2-3 weeks w/ apt same day to review results. -offered BB therapy PRN pt refused states symptoms are not botrhersome. 
Clothing

## 2024-02-23 ENCOUNTER — EMERGENCY (EMERGENCY)
Facility: HOSPITAL | Age: 64
LOS: 1 days | Discharge: HOME | End: 2024-02-23
Attending: STUDENT IN AN ORGANIZED HEALTH CARE EDUCATION/TRAINING PROGRAM | Admitting: STUDENT IN AN ORGANIZED HEALTH CARE EDUCATION/TRAINING PROGRAM
Payer: COMMERCIAL

## 2024-02-23 VITALS
TEMPERATURE: 98 F | DIASTOLIC BLOOD PRESSURE: 74 MMHG | OXYGEN SATURATION: 100 % | RESPIRATION RATE: 95 BRPM | SYSTOLIC BLOOD PRESSURE: 143 MMHG | HEART RATE: 60 BPM

## 2024-02-23 DIAGNOSIS — Z98.89 OTHER SPECIFIED POSTPROCEDURAL STATES: Chronic | ICD-10-CM

## 2024-02-23 LAB
ALBUMIN SERPL ELPH-MCNC: 4.6 G/DL — SIGNIFICANT CHANGE UP (ref 3.3–5)
ALP SERPL-CCNC: 77 U/L — SIGNIFICANT CHANGE UP (ref 40–120)
ALT FLD-CCNC: 24 U/L — SIGNIFICANT CHANGE UP (ref 4–41)
ANION GAP SERPL CALC-SCNC: 12 MMOL/L — SIGNIFICANT CHANGE UP (ref 7–14)
APPEARANCE UR: CLEAR — SIGNIFICANT CHANGE UP
AST SERPL-CCNC: 22 U/L — SIGNIFICANT CHANGE UP (ref 4–40)
BASE EXCESS BLDV CALC-SCNC: 1.9 MMOL/L — SIGNIFICANT CHANGE UP (ref -2–3)
BASOPHILS # BLD AUTO: 0.03 K/UL — SIGNIFICANT CHANGE UP (ref 0–0.2)
BASOPHILS NFR BLD AUTO: 0.4 % — SIGNIFICANT CHANGE UP (ref 0–2)
BILIRUB SERPL-MCNC: 0.5 MG/DL — SIGNIFICANT CHANGE UP (ref 0.2–1.2)
BILIRUB UR-MCNC: NEGATIVE — SIGNIFICANT CHANGE UP
BLOOD GAS VENOUS COMPREHENSIVE RESULT: SIGNIFICANT CHANGE UP
BUN SERPL-MCNC: 12 MG/DL — SIGNIFICANT CHANGE UP (ref 7–23)
CALCIUM SERPL-MCNC: 9.5 MG/DL — SIGNIFICANT CHANGE UP (ref 8.4–10.5)
CHLORIDE BLDV-SCNC: 103 MMOL/L — SIGNIFICANT CHANGE UP (ref 96–108)
CHLORIDE SERPL-SCNC: 103 MMOL/L — SIGNIFICANT CHANGE UP (ref 98–107)
CO2 BLDV-SCNC: 29.2 MMOL/L — HIGH (ref 22–26)
CO2 SERPL-SCNC: 25 MMOL/L — SIGNIFICANT CHANGE UP (ref 22–31)
COLOR SPEC: YELLOW — SIGNIFICANT CHANGE UP
CREAT SERPL-MCNC: 0.88 MG/DL — SIGNIFICANT CHANGE UP (ref 0.5–1.3)
DIFF PNL FLD: NEGATIVE — SIGNIFICANT CHANGE UP
EGFR: 97 ML/MIN/1.73M2 — SIGNIFICANT CHANGE UP
EOSINOPHIL # BLD AUTO: 0.06 K/UL — SIGNIFICANT CHANGE UP (ref 0–0.5)
EOSINOPHIL NFR BLD AUTO: 0.8 % — SIGNIFICANT CHANGE UP (ref 0–6)
GAS PNL BLDV: 137 MMOL/L — SIGNIFICANT CHANGE UP (ref 136–145)
GLUCOSE BLDV-MCNC: 99 MG/DL — SIGNIFICANT CHANGE UP (ref 70–99)
GLUCOSE SERPL-MCNC: 97 MG/DL — SIGNIFICANT CHANGE UP (ref 70–99)
GLUCOSE UR QL: NEGATIVE MG/DL — SIGNIFICANT CHANGE UP
HCO3 BLDV-SCNC: 28 MMOL/L — SIGNIFICANT CHANGE UP (ref 22–29)
HCT VFR BLD CALC: 45.8 % — SIGNIFICANT CHANGE UP (ref 39–50)
HCT VFR BLDA CALC: 47 % — SIGNIFICANT CHANGE UP (ref 39–51)
HGB BLD CALC-MCNC: 15.5 G/DL — SIGNIFICANT CHANGE UP (ref 12.6–17.4)
HGB BLD-MCNC: 15.3 G/DL — SIGNIFICANT CHANGE UP (ref 13–17)
IANC: 4.36 K/UL — SIGNIFICANT CHANGE UP (ref 1.8–7.4)
IMM GRANULOCYTES NFR BLD AUTO: 0.3 % — SIGNIFICANT CHANGE UP (ref 0–0.9)
KETONES UR-MCNC: 15 MG/DL
LACTATE BLDV-MCNC: 1.6 MMOL/L — SIGNIFICANT CHANGE UP (ref 0.5–2)
LEUKOCYTE ESTERASE UR-ACNC: NEGATIVE — SIGNIFICANT CHANGE UP
LYMPHOCYTES # BLD AUTO: 2.7 K/UL — SIGNIFICANT CHANGE UP (ref 1–3.3)
LYMPHOCYTES # BLD AUTO: 35.2 % — SIGNIFICANT CHANGE UP (ref 13–44)
MAGNESIUM SERPL-MCNC: 2.3 MG/DL — SIGNIFICANT CHANGE UP (ref 1.6–2.6)
MCHC RBC-ENTMCNC: 28.7 PG — SIGNIFICANT CHANGE UP (ref 27–34)
MCHC RBC-ENTMCNC: 33.4 GM/DL — SIGNIFICANT CHANGE UP (ref 32–36)
MCV RBC AUTO: 85.8 FL — SIGNIFICANT CHANGE UP (ref 80–100)
MONOCYTES # BLD AUTO: 0.5 K/UL — SIGNIFICANT CHANGE UP (ref 0–0.9)
MONOCYTES NFR BLD AUTO: 6.5 % — SIGNIFICANT CHANGE UP (ref 2–14)
NEUTROPHILS # BLD AUTO: 4.36 K/UL — SIGNIFICANT CHANGE UP (ref 1.8–7.4)
NEUTROPHILS NFR BLD AUTO: 56.8 % — SIGNIFICANT CHANGE UP (ref 43–77)
NITRITE UR-MCNC: NEGATIVE — SIGNIFICANT CHANGE UP
NRBC # BLD: 0 /100 WBCS — SIGNIFICANT CHANGE UP (ref 0–0)
NRBC # FLD: 0 K/UL — SIGNIFICANT CHANGE UP (ref 0–0)
PCO2 BLDV: 47 MMHG — SIGNIFICANT CHANGE UP (ref 42–55)
PH BLDV: 7.38 — SIGNIFICANT CHANGE UP (ref 7.32–7.43)
PH UR: 5.5 — SIGNIFICANT CHANGE UP (ref 5–8)
PLATELET # BLD AUTO: 302 K/UL — SIGNIFICANT CHANGE UP (ref 150–400)
PO2 BLDV: 46 MMHG — HIGH (ref 25–45)
POTASSIUM BLDV-SCNC: 3.7 MMOL/L — SIGNIFICANT CHANGE UP (ref 3.5–5.1)
POTASSIUM SERPL-MCNC: 3.9 MMOL/L — SIGNIFICANT CHANGE UP (ref 3.5–5.3)
POTASSIUM SERPL-SCNC: 3.9 MMOL/L — SIGNIFICANT CHANGE UP (ref 3.5–5.3)
PROT SERPL-MCNC: 7.2 G/DL — SIGNIFICANT CHANGE UP (ref 6–8.3)
PROT UR-MCNC: NEGATIVE MG/DL — SIGNIFICANT CHANGE UP
RBC # BLD: 5.34 M/UL — SIGNIFICANT CHANGE UP (ref 4.2–5.8)
RBC # FLD: 14.5 % — SIGNIFICANT CHANGE UP (ref 10.3–14.5)
SAO2 % BLDV: 76.1 % — SIGNIFICANT CHANGE UP (ref 67–88)
SODIUM SERPL-SCNC: 140 MMOL/L — SIGNIFICANT CHANGE UP (ref 135–145)
SP GR SPEC: 1.02 — SIGNIFICANT CHANGE UP (ref 1–1.03)
TROPONIN T, HIGH SENSITIVITY RESULT: 6 NG/L — SIGNIFICANT CHANGE UP
UROBILINOGEN FLD QL: 0.2 MG/DL — SIGNIFICANT CHANGE UP (ref 0.2–1)
WBC # BLD: 7.67 K/UL — SIGNIFICANT CHANGE UP (ref 3.8–10.5)
WBC # FLD AUTO: 7.67 K/UL — SIGNIFICANT CHANGE UP (ref 3.8–10.5)

## 2024-02-23 PROCEDURE — 71046 X-RAY EXAM CHEST 2 VIEWS: CPT | Mod: 26

## 2024-02-23 PROCEDURE — 93010 ELECTROCARDIOGRAM REPORT: CPT

## 2024-02-23 PROCEDURE — 99285 EMERGENCY DEPT VISIT HI MDM: CPT

## 2024-02-23 RX ORDER — SODIUM CHLORIDE 9 MG/ML
1000 INJECTION INTRAMUSCULAR; INTRAVENOUS; SUBCUTANEOUS ONCE
Refills: 0 | Status: COMPLETED | OUTPATIENT
Start: 2024-02-23 | End: 2024-02-23

## 2024-02-23 RX ADMIN — SODIUM CHLORIDE 1000 MILLILITER(S): 9 INJECTION INTRAMUSCULAR; INTRAVENOUS; SUBCUTANEOUS at 22:45

## 2024-02-23 NOTE — ED ADULT TRIAGE NOTE - CHIEF COMPLAINT QUOTE
c/o generalized weakness and lethargy x 2 weeks. States has episodes where he feels close to passing out. States has lost 25 pounds over 5 months. hx of diverticulitis.

## 2024-02-23 NOTE — ED PROVIDER NOTE - NSFOLLOWUPINSTRUCTIONS_ED_ALL_ED_FT
Please follow up with your cardiologist as we discussed.     Get help right away if you:  Have a severe headache. Faint once or repeatedly. Have pain in your chest, abdomen, or back. Have a very fast or irregular heartbeat (palpitations). Have pain when you breathe. Are bleeding from your mouth or rectum, or you have black or tarry stool. Have a seizure. Are confused. Have trouble walking. Have severe weakness. Have vision problems. These symptoms may represent a serious problem that is an emergency. Do not wait to see if your symptoms will go away. Get medical help right away. Call your local emergency services (911 in the [place] Do not drive yourself to the hospital.

## 2024-02-23 NOTE — ED PROVIDER NOTE - NSFOLLOWUPCLINICS_GEN_ALL_ED_FT
Cardiology at [facility]  Cardiology  [address] [place] [id number]  Phone: [phone]  Fax:     Cardiology at [facility]  Cardiology  [address] [place] [address]  Phone: [phone]  Fax:

## 2024-02-23 NOTE — ED PROVIDER NOTE - PROGRESS NOTE DETAILS
Dashawn, Attending  Labs non-actionable. EKG shows sinus bradycardia seen on prior EKG and HR currently in 60s. Patient reports asymptomatic at this time and reports feels significantly improved after fluids. Patient has cardiologist to follow up with. Offered CDU for hx of bradycardia/symptoms but patient declined and reports no symptoms at this time and will follow up with cardiologist. Dced with return precautions. Dashawn, Attending  Labs non-actionable. EKG shows sinus bradycardia seen on prior EKG and HR currently in 60s. Patient reports asymptomatic at this time and reports feels significantly improved after fluids. Offered CDU for hx of bradycardia/symptoms but patient declined and reports no symptoms at this time and will follow up with cardiologist. Dced with return precautions.

## 2024-02-23 NOTE — ED PROVIDER NOTE - PHYSICAL EXAMINATION
Gen: WDWN, NAD, comfortable appearing, hemodynamically stable, afebrile    HEENT: PERRLA, EOMI, no nasal discharge, mucous membranes mildly dry, no oropharyngeal edema/erythema/exudates   CV: RRR, +S1/S2, no M/R/G, equal b/l radial pulses 2+  Resp: CTAB, no W/R/R, SPO2 >95% on RA, no increased WOB   GI: Abdomen soft non-distended, NTTP, no masses/organomegaly   MSK/Skin: No CVA tenderness, no open wounds, no bruising, no LE edema/calf tenderness   Neuro: CN2-12 grossly intact, A&Ox4, MS +5/5 in UE and LE BL, finger to nose smooth and rapid, gross sensation intact in UE and LE BL, gait smooth and coordinated, negative rhomberg, negative pronator drift   Psych: appropriate mood

## 2024-02-23 NOTE — ED ADULT NURSE NOTE - CHIEF COMPLAINT QUOTE
c/o generalized weakness and lethargy x 2 weeks. States has episodes where he feels close to passing out. States has lost 25 pounds over 5 months. hx of diverticulitis.
3

## 2024-02-23 NOTE — ED PROVIDER NOTE - WR ORDER STATUS 1
Airway  Performed by: No Hardy MD  Authorized by: No Hardy MD     Final Airway Type:  Supraglottic airway  Final Supraglottic Airway:  Air-Q  SGA Size*:  2.5  Attempts*:  1  Number of Other Approaches Attempted:  0   Patient Identified, Procedure confirmed, Emergency equipment available and Safety protocols followed  Location:  OR  Urgency:  Elective  Difficult Airway: No    Indications for Airway Management:  Anesthesia  Sedation Level:  Deep  Mask Difficulty Assessment:  1 - vent by mask  Performed By:  Anesthesiologist  Anesthesiologist:  No Hardy MD         Resulted

## 2024-02-23 NOTE — ED PROVIDER NOTE - PATIENT PORTAL LINK FT
You can access the FollowMyHealth Patient Portal offered by [facility] by registering at the following website: [email or web address]. By joining [facility]’s FollowMyHealth portal, you will also be able to view your health information using other applications (apps) compatible with our system.

## 2024-02-23 NOTE — ED PROVIDER NOTE - CLINICAL SUMMARY MEDICAL DECISION MAKING FREE TEXT BOX
Dashawn, Attending  63-year-old male with past medical history of diverticulitis presenting with generalized weakness, dysuria/increased urinary frequency, tonight had episode of lightheadedness/dizziness with no LOC/chest pain/shortness of breath, hemodynamically stable, afebrile, nontoxic-appearing, no FND.  Exam/history concerning for but not limited to anemia versus metabolic derangement versus infectious etiology/UTI versus suspicion for ACS.  Will evaluate with basic labs, cardiac enzymes, EKG, screening chest x-ray, UA/UC, fluid bolus and reassess

## 2024-02-23 NOTE — ED PROVIDER NOTE - OBJECTIVE STATEMENT
63-year-old male with past medical history of diverticulitis presenting with generalized weakness. Patient reports for the past 2 weeks he has felt off, generalized fatigue with no associated focal weakness, numbness/tingling.  Reports since starting his new job that requires a lot more walking around 5 months ago he has lost around 20 to 25 pounds.  Tonight he felt a little lightheaded/dizzy with no associated LOC, chest pain, shortness of breath, palpitations, diaphoresis, leg pain/swelling, hemoptysis, recent travel/bedbound nature, or history of blood clots.  Denies any night sweats, fever/chills, N/V/D, abdominal pain, cough, rashes, black/bloody stools. Does report mild increased urinary frequency and intermittent dysuria. No recent falls/trauma.

## 2024-02-23 NOTE — ED ADULT NURSE NOTE - NSFALLUNIVINTERV_ED_ALL_ED
Bed/Stretcher in lowest position, wheels locked, appropriate side rails in place/Call bell, personal items and telephone in reach/Instruct patient to call for assistance before getting out of bed/chair/stretcher/Non-slip footwear applied when patient is off stretcher/[place] to call system/Physically safe environment - no spills, clutter or unnecessary equipment/Purposeful proactive rounding/Room/bathroom lighting operational, light cord in reach

## 2024-02-23 NOTE — ED ADULT NURSE NOTE - OBJECTIVE STATEMENT
Pt received in intake 8. Pt alert and oriented x 3, ambulatory at baseline. Hx of diverticulitis. Pt c/o generalized weakness and lethargy x 2 weeks. Pt states he feels "off". Pt reports losing 25 pounds over 5 months. Respirations even and unlabored, NAD. Pt denies chest pain, shortness of breath, headache, dizziness, weakness, fever, chills,  symptoms. 20G IV in the left forearm, labs drawn and fluids infusing per MD orders.

## 2024-02-24 VITALS
HEART RATE: 56 BPM | OXYGEN SATURATION: 100 % | TEMPERATURE: 99 F | DIASTOLIC BLOOD PRESSURE: 76 MMHG | SYSTOLIC BLOOD PRESSURE: 122 MMHG | RESPIRATION RATE: 18 BRPM

## 2024-02-25 LAB
CULTURE RESULTS: NO GROWTH — SIGNIFICANT CHANGE UP
SPECIMEN SOURCE: SIGNIFICANT CHANGE UP

## 2024-02-28 ENCOUNTER — EMERGENCY (EMERGENCY)
Facility: HOSPITAL | Age: 64
LOS: 1 days | Discharge: HOME | End: 2024-02-28
Attending: EMERGENCY MEDICINE | Admitting: EMERGENCY MEDICINE
Payer: COMMERCIAL

## 2024-02-28 VITALS
RESPIRATION RATE: 16 BRPM | DIASTOLIC BLOOD PRESSURE: 81 MMHG | OXYGEN SATURATION: 97 % | HEART RATE: 64 BPM | TEMPERATURE: 98 F | SYSTOLIC BLOOD PRESSURE: 139 MMHG

## 2024-02-28 DIAGNOSIS — Z98.89 OTHER SPECIFIED POSTPROCEDURAL STATES: Chronic | ICD-10-CM

## 2024-02-28 LAB
ADD ON TEST-SPECIMEN IN LAB: SIGNIFICANT CHANGE UP
ALBUMIN SERPL ELPH-MCNC: 4.2 G/DL — SIGNIFICANT CHANGE UP (ref 3.3–5)
ALP SERPL-CCNC: 68 U/L — SIGNIFICANT CHANGE UP (ref 40–120)
ALT FLD-CCNC: 18 U/L — SIGNIFICANT CHANGE UP (ref 4–41)
ANION GAP SERPL CALC-SCNC: 11 MMOL/L — SIGNIFICANT CHANGE UP (ref 7–14)
AST SERPL-CCNC: 17 U/L — SIGNIFICANT CHANGE UP (ref 4–40)
BASOPHILS # BLD AUTO: 0.02 K/UL — SIGNIFICANT CHANGE UP (ref 0–0.2)
BASOPHILS NFR BLD AUTO: 0.3 % — SIGNIFICANT CHANGE UP (ref 0–2)
BILIRUB SERPL-MCNC: 0.5 MG/DL — SIGNIFICANT CHANGE UP (ref 0.2–1.2)
BUN SERPL-MCNC: 10 MG/DL — SIGNIFICANT CHANGE UP (ref 7–23)
CALCIUM SERPL-MCNC: 9.4 MG/DL — SIGNIFICANT CHANGE UP (ref 8.4–10.5)
CHLORIDE SERPL-SCNC: 105 MMOL/L — SIGNIFICANT CHANGE UP (ref 98–107)
CO2 SERPL-SCNC: 26 MMOL/L — SIGNIFICANT CHANGE UP (ref 22–31)
CREAT SERPL-MCNC: 0.74 MG/DL — SIGNIFICANT CHANGE UP (ref 0.5–1.3)
EGFR: 102 ML/MIN/1.73M2 — SIGNIFICANT CHANGE UP
EOSINOPHIL # BLD AUTO: 0.05 K/UL — SIGNIFICANT CHANGE UP (ref 0–0.5)
EOSINOPHIL NFR BLD AUTO: 0.6 % — SIGNIFICANT CHANGE UP (ref 0–6)
GLUCOSE SERPL-MCNC: 97 MG/DL — SIGNIFICANT CHANGE UP (ref 70–99)
HCT VFR BLD CALC: 44.4 % — SIGNIFICANT CHANGE UP (ref 39–50)
HGB BLD-MCNC: 14.7 G/DL — SIGNIFICANT CHANGE UP (ref 13–17)
IANC: 4.91 K/UL — SIGNIFICANT CHANGE UP (ref 1.8–7.4)
IMM GRANULOCYTES NFR BLD AUTO: 0.1 % — SIGNIFICANT CHANGE UP (ref 0–0.9)
LYMPHOCYTES # BLD AUTO: 2.55 K/UL — SIGNIFICANT CHANGE UP (ref 1–3.3)
LYMPHOCYTES # BLD AUTO: 32 % — SIGNIFICANT CHANGE UP (ref 13–44)
MCHC RBC-ENTMCNC: 28.8 PG — SIGNIFICANT CHANGE UP (ref 27–34)
MCHC RBC-ENTMCNC: 33.1 GM/DL — SIGNIFICANT CHANGE UP (ref 32–36)
MCV RBC AUTO: 86.9 FL — SIGNIFICANT CHANGE UP (ref 80–100)
MONOCYTES # BLD AUTO: 0.42 K/UL — SIGNIFICANT CHANGE UP (ref 0–0.9)
MONOCYTES NFR BLD AUTO: 5.3 % — SIGNIFICANT CHANGE UP (ref 2–14)
NEUTROPHILS # BLD AUTO: 4.91 K/UL — SIGNIFICANT CHANGE UP (ref 1.8–7.4)
NEUTROPHILS NFR BLD AUTO: 61.7 % — SIGNIFICANT CHANGE UP (ref 43–77)
NRBC # BLD: 0 /100 WBCS — SIGNIFICANT CHANGE UP (ref 0–0)
NRBC # FLD: 0 K/UL — SIGNIFICANT CHANGE UP (ref 0–0)
PLATELET # BLD AUTO: 295 K/UL — SIGNIFICANT CHANGE UP (ref 150–400)
POTASSIUM SERPL-MCNC: 4.4 MMOL/L — SIGNIFICANT CHANGE UP (ref 3.5–5.3)
POTASSIUM SERPL-SCNC: 4.4 MMOL/L — SIGNIFICANT CHANGE UP (ref 3.5–5.3)
PROT SERPL-MCNC: 6.8 G/DL — SIGNIFICANT CHANGE UP (ref 6–8.3)
RBC # BLD: 5.11 M/UL — SIGNIFICANT CHANGE UP (ref 4.2–5.8)
RBC # FLD: 14.3 % — SIGNIFICANT CHANGE UP (ref 10.3–14.5)
SODIUM SERPL-SCNC: 142 MMOL/L — SIGNIFICANT CHANGE UP (ref 135–145)
TROPONIN T, HIGH SENSITIVITY RESULT: <6 NG/L — SIGNIFICANT CHANGE UP
TSH SERPL-MCNC: 0.81 UIU/ML — SIGNIFICANT CHANGE UP (ref 0.27–4.2)
WBC # BLD: 7.96 K/UL — SIGNIFICANT CHANGE UP (ref 3.8–10.5)
WBC # FLD AUTO: 7.96 K/UL — SIGNIFICANT CHANGE UP (ref 3.8–10.5)

## 2024-02-28 PROCEDURE — 99223 1ST HOSP IP/OBS HIGH 75: CPT

## 2024-02-28 PROCEDURE — 93010 ELECTROCARDIOGRAM REPORT: CPT

## 2024-02-28 PROCEDURE — 71046 X-RAY EXAM CHEST 2 VIEWS: CPT | Mod: 26

## 2024-02-28 RX ORDER — SODIUM CHLORIDE 9 MG/ML
1000 INJECTION INTRAMUSCULAR; INTRAVENOUS; SUBCUTANEOUS ONCE
Refills: 0 | Status: COMPLETED | OUTPATIENT
Start: 2024-02-28 | End: 2024-02-28

## 2024-02-28 RX ADMIN — SODIUM CHLORIDE 1000 MILLILITER(S): 9 INJECTION INTRAMUSCULAR; INTRAVENOUS; SUBCUTANEOUS at 17:42

## 2024-02-28 NOTE — ED CDU PROVIDER INITIAL DAY NOTE - ATTENDING APP SHARED VISIT CONTRIBUTION OF CARE
I have evaluated the patient and agree with the documentation and assessment as made by the PA. We have discussed plan of care and work up for the patient.    GEN:  Non-toxic appearing, non-distressed, speaking full sentences, non-diaphoretic, AAOx3  HEENT:  NCAT, neck supple, EOMI, PERRLA, sclera anicteric, no conjunctival pallor or injection, no stridor, normal voice, no tonsillar exudate, uvula midline  CV:  regular rhythm and rate, s1/s2 audible, no murmurs, rubs or gallops, peripheral pulses 2+ and symmetric  PULM:  non-labored respirations, lungs clear to auscultation bilaterally, no wheezes, crackles or rales  ABD:  non distended, non-tender, no rebound, no guarding, negative Gonzalez's sign, bowel sounds normal, no cvat  MSK:  no gross deformity, non-tender extremities and joints, range of motion grossly normal appearing, no extremity edema, extremities warm and well perfused   NEURO:  AAOx3, CN II-XII intact, motor 5/5 in upper and lower extremities bilaterally, sensation grossly intact in extremities and trunk, finger to nose testing wnl, no nystagmus, negative Romberg, no pronator drift, no gait deficit  SKIN:  warm, dry, no rash or vesicles

## 2024-02-28 NOTE — ED ADULT TRIAGE NOTE - CHIEF COMPLAINT QUOTE
pt ambulatory into triage c/o weakness, constipation, urinary frequency, intermittent blurry vision x2 weeks. pt also states "my heart rate is low" but WNL at this time. denies dizziness, chest pain, sob.  past medical history: enlarged prostate, diverticulitis, kidney stones

## 2024-02-28 NOTE — ED ADULT NURSE NOTE - OBJECTIVE STATEMENT
Patient presents to ED for c/o generalized weakness, "feeling low energy and slow heart rate" x2 weeks. He is A&Ox4, in no acute distress, calm, cooperative, well-appearing. He says occasional dizziness associated with chief complaint. No dizziness right now. Denies CP, SOB, dyspnea, N/V, fevers, chills. Respirations even, unlabored on room air. No pallor, no cyanosis. No edema noted. History of BPH, Diverticulitis, Kidney stones. VS stable. 20G IV placed left AC, labs drawn and sent.

## 2024-02-28 NOTE — ED CDU PROVIDER INITIAL DAY NOTE - OBJECTIVE STATEMENT
63-year-old male with past medical history of BPH, diverticulitis, kidney stones presents to the ER complaining of 2 weeks worth of generalized weakness, fatigue, intermittent episodes of lightheadedness, intermittent episodes of blurry vision presents for the second time this week for symptoms.  Patient states 2 days ago he had an episode of left-sided chest pain radiating lasting seconds he took apple cider vinegar mixed in with water which seemed to resolve his pain.  Patient feels there is an issue with his heart, heart rate is low.  Patient also reports urinary frequency.  Denies dysuria, hematuria, fevers, chills, nausea, vomiting, diarrhea, abdominal pain, focal weakness, numbness, tingling, headaches, cough, sore throat.    CDU DAGMAR Mcbride: Agree with above. While in ER labs, unremarkable including troponin < 6 and normal TSH. EKG with no active signs of ischemia, and CXR with no acute findings. Pt was seen on 02/23/24 for similar sxs with negative work up including negative RVP and urine cx. Pt was offered CDU stay on 02/23/24 but did not want to stay but is agreeable to stay today for echocardiogram, telemetry and evaluation by tele doc of the day.

## 2024-02-28 NOTE — ED PROVIDER NOTE - CLINICAL SUMMARY MEDICAL DECISION MAKING FREE TEXT BOX
63-year-old male with past medical history of BPH, diverticulitis, kidney stones presents to the ER complaining of 2 weeks worth of generalized weakness, fatigue, intermittent episodes of lightheadedness, intermittent episodes of presents for the second time this week for symptoms.  Patient states 2 days ago he had an episode of left-sided chest pain radiating lasting seconds he took apple cider vinegar mixed in with water which seemed to resolve his pain.  Patient feels there is an issue with his heart, heart rate is low.  Patient also reports urinary frequency.  Denies dysuria, hematuria, fevers, chills, nausea, vomiting, diarrhea, abdominal pain, focal weakness, numbness, tingling, headaches, cough, sore throat.     labs, trop, tsh, ekg showing sinus tachycardia, triage bradycardia to 57. 63-year-old male with past medical history of BPH, diverticulitis, kidney stones presents to the ER complaining of 2 weeks worth of generalized weakness, fatigue, intermittent episodes of lightheadedness, intermittent episodes of presents for the second time this week for symptoms.  Patient states 2 days ago he had an episode of left-sided chest pain radiating lasting seconds he took apple cider vinegar mixed in with water which seemed to resolve his pain.  Patient feels there is an issue with his heart, heart rate is low.  Patient also reports urinary frequency.  Denies dysuria, hematuria, fevers, chills, nausea, vomiting, diarrhea, abdominal pain, focal weakness, numbness, tingling, headaches, cough, sore throat.     labs, trop, tsh, ekg showing sinus tachycardia, triage bradycardia to 57.    previous work up unremarkable 4 days, urine culture negative.

## 2024-02-28 NOTE — ED CDU PROVIDER INITIAL DAY NOTE - CLINICAL SUMMARY MEDICAL DECISION MAKING FREE TEXT BOX
63-year-old male with past medical history of BPH, diverticulitis, kidney stones presents to the ER complaining of 2 weeks worth of generalized weakness, fatigue, intermittent episodes of lightheadedness, intermittent episodes of blurry vision presents for the second time this week for symptoms.  Patient states 2 days ago he had an episode of left-sided chest pain radiating lasting seconds he took apple cider vinegar mixed in with water which seemed to resolve his pain.  Patient feels there is an issue with his heart, heart rate is low.  Patient also reports urinary frequency.  Denies dysuria, hematuria, fevers, chills, nausea, vomiting, diarrhea, abdominal pain, focal weakness, numbness, tingling, headaches, cough, sore throat.    CDU DAGMAR Mcbride: Agree with above. At time I evaluated the patient he states he felt while with no acute complaints but concerned to go home as leave home alone and has these sxs intermittently. While in ER labs, unremarkable including troponin < 6 and normal TSH. EKG with no active signs of ischemia, and CXR with no acute findings. Pt was seen on 02/23/24 for similar sxs with negative work up including negative RVP and urine cx. Pt was offered CDU stay on 02/23/24 but did not want to stay but is agreeable to stay today for echocardiogram, telemetry and evaluation by tele doc of the day.

## 2024-02-28 NOTE — ED PROVIDER NOTE - OBJECTIVE STATEMENT
63-year-old male with past medical history of BPH, diverticulitis, kidney stones presents to the ER complaining of 2 weeks worth of generalized weakness, fatigue, intermittent episodes of lightheadedness, intermittent episodes of presents for the second time this week for symptoms.  Patient states 2 days ago he had an episode of left-sided chest pain radiating lasting seconds he took apple cider vinegar mixed in with water which seemed to resolve his pain.  Patient feels there is an issue with his heart, heart rate is low.  Patient also reports urinary frequency.  Denies dysuria, hematuria, fevers, chills, nausea, vomiting, diarrhea, abdominal pain, focal weakness, numbness, tingling, headaches, cough, sore throat. 63-year-old male with past medical history of BPH, diverticulitis, kidney stones presents to the ER complaining of 2 weeks worth of generalized weakness, fatigue, intermittent episodes of lightheadedness, intermittent episodes of blurry vision presents for the second time this week for symptoms.  Patient states 2 days ago he had an episode of left-sided chest pain radiating lasting seconds he took apple cider vinegar mixed in with water which seemed to resolve his pain.  Patient feels there is an issue with his heart, heart rate is low.  Patient also reports urinary frequency.  Denies dysuria, hematuria, fevers, chills, nausea, vomiting, diarrhea, abdominal pain, focal weakness, numbness, tingling, headaches, cough, sore throat.

## 2024-02-28 NOTE — ED PROVIDER NOTE - PHYSICAL EXAMINATION
Vital signs reviewed.   CONSTITUTIONAL: Well-appearing; well-nourished; in no apparent distress. Non-toxic appearing.   HEAD: Normocephalic, atraumatic.  EYES: PERRL, EOM intact, conjunctiva and sclera WNL. vision 20/25   ENT: normal nose; no rhinorrhea; normal pharynx with no tonsillar hypertrophy, no erythema, no exudate, no lymphadenopathy.  NECK/LYMPH: Supple; non-tender; no cervical lymphadenopathy.  CARD: Normal S1, S2; no murmurs, rubs, or gallops noted.  RESP: Normal chest excursion with respiration; breath sounds clear and equal bilaterally; no wheezes, rhonchi, or rales.  ABD/GI: soft, non-distended; non-tender; no palpable organomegaly, no pulsatile mass.  EXT/MS: moves all extremities; distal pulses are normal, no pedal edema.  SKIN: Normal for age and race; warm; dry; good turgor; no apparent lesions or exudate noted.  NEURO: Awake, alert, oriented x 3, no gross deficits, CN II-XII grossly intact, no motor or sensory deficit noted.  PSYCH: Normal mood; appropriate affect.

## 2024-02-28 NOTE — ED PROVIDER NOTE - ATTENDING CONTRIBUTION TO CARE
Brief HPI:  63-year-old male past medical history of BPH, diverticulitis, kidney stones presents with generalized weakness, fatigue, blurry vision, lightheadedness for approximately 2 weeks.  Patient states that he is not experiencing blurry vision at this time.  He was seen in ED 2/23/2024 for similar symptoms with negative lab workup and RVP.  He was discharged with instruction to follow-up with a cardiologist however he states that symptoms have been persistent prompting visit to the ED.  He feels that his heart rate is low.  Denies fever, chills, nausea, vomiting, chest pain, abdominal pain, shortness of breath, leg swelling.    Vitals:   Reviewed    Exam:    GEN:  Non-toxic appearing, non-distressed, speaking full sentences, non-diaphoretic, AAOx3  HEENT:  NCAT, neck supple, EOMI, PERRLA, sclera anicteric, no conjunctival pallor or injection, no stridor, normal voice, no tonsillar exudate, uvula midline  CV:  regular rhythm and rate, s1/s2 audible, no murmurs, rubs or gallops, peripheral pulses 2+ and symmetric  PULM:  non-labored respirations, lungs clear to auscultation bilaterally, no wheezes, crackles or rales  ABD:  non distended, non-tender, no rebound, no guarding, negative Gonzalez's sign, bowel sounds normal, no cvat  MSK:  no gross deformity, non-tender extremities and joints, range of motion grossly normal appearing, no extremity edema, extremities warm and well perfused   NEURO:  AAOx3, CN II-XII intact, motor 5/5 in upper and lower extremities bilaterally, sensation grossly intact in extremities and trunk, finger to nose testing wnl, no nystagmus, negative Romberg, no pronator drift, no gait deficit  SKIN:  warm, dry, no rash or vesicles     A/P:  63-year-old male past medical history of BPH, diverticulitis, kidney stones presents with generalized weakness, fatigue, blurry vision, lightheadedness for approximately 2 weeks.  Vital signs stable.  EKG sinus tachycardia without ischemic change.  Low concern for acute CVA, NIH stroke scale 0.  Low concern for acute coronary syndrome.  No apparent infectious etiology.  Will send labs, chest x-ray, supportive care.  Possible CDU for echo.  Disposition pending.

## 2024-02-28 NOTE — ED ADULT NURSE NOTE - NSFALLUNIVINTERV_ED_ALL_ED
Bed/Stretcher in lowest position, wheels locked, appropriate side rails in place/Call bell, personal items and telephone in reach/Instruct patient to call for assistance before getting out of bed/chair/stretcher/Non-slip footwear applied when patient is off stretcher/[place] to call system/Physically safe environment - no spills, clutter or unnecessary equipment/Purposeful proactive rounding/Room/bathroom lighting operational, light cord in reach

## 2024-02-29 ENCOUNTER — RESULT REVIEW (OUTPATIENT)
Age: 64
End: 2024-02-29

## 2024-02-29 VITALS
DIASTOLIC BLOOD PRESSURE: 70 MMHG | OXYGEN SATURATION: 96 % | SYSTOLIC BLOOD PRESSURE: 123 MMHG | RESPIRATION RATE: 18 BRPM | TEMPERATURE: 98 F | HEART RATE: 57 BPM

## 2024-02-29 PROCEDURE — 93306 TTE W/DOPPLER COMPLETE: CPT | Mod: 26

## 2024-02-29 PROCEDURE — 99238 HOSP IP/OBS DSCHRG MGMT 30/<: CPT

## 2024-02-29 NOTE — CONSULT NOTE ADULT - SUBJECTIVE AND OBJECTIVE BOX
date of consult 2/29/24    HISTORY OF PRESENT ILLNESS: HPI:    63 year old male PMH BPH presents with 2 weeks of ongoing weakness, lightheadedness, dizziness, blurry vision and near syncope.  He presented here 2/23, RVP negative and labs WNL, so he received IVF and was D/C from ER. He reports symptoms did not improve. Denies chest pain or SOB. He is concerned over heart rates in the 50s while on telemetry.  DEnies prior cardiac work up.  Denies dysuria, hematuria, fevers, chills, nausea, vomiting, diarrhea, abdominal pain, focal weakness, numbness, tingling, headaches, cough, sore throat.      PAST MEDICAL & SURGICAL HISTORY:  Diverticulitis      Oral thrush      H/O hernia repair    MEDICATIONS  (STANDING):  proscar?      Allergies    Mysoline (Rash)  Flagyl (Unknown)  ciprofloxacin (Unknown)  Bactrim (Eye Irritation)  nystatin (Hives; Rash)    FAMILY HISTORY:  Noncontributory for premature coronary disease or sudden cardiac death    SOCIAL HISTORY:    [ x] Non-smoker  [ ] Smoker  [ ] Alcohol    FLU VACCINE THIS YEAR STARTS IN AUGUST:  [ ] Yes    [ ] No    IF OVER 65 HAVE YOU EVER HAD A PNA VACCINE:  [ ] Yes    [ ] No       [ ] N/A      REVIEW OF SYSTEMS:  [ ]chest pain  [  ]shortness of breath  [  ]palpitations  [  ]syncope  [ x]near syncope [ ]upper extremity weakness   [ ] lower extremity weakness  [  ]diplopia  [  ]altered mental status   [  ]fevers  [ ]chills [ ]nausea  [ ]vomiting  [  ]dysphagia    [ ]abdominal pain  [ ]melena  [ ]BRBPR    [  ]epistaxis  [  ]rash    [ ]lower extremity edema        [x ] All others negative	  [ ] Unable to obtain      LABS:	 	    CARDIAC MARKERS:  Trop T 6, <6                        14.7   7.96  )-----------( 295      ( 28 Feb 2024 17:46 )             44.4     142  |  105  |  10  ----------------------------<  97  4.4   |  26  |  0.74    Ca    9.4      28 Feb 2024 17:46    TPro  6.8  /  Alb  4.2  /  TBili  0.5  /  DBili  x   /  AST  17  /  ALT  18  /  AlkPhos  68  02-28    Creatinine Trend: 0.74<--, 0.88<--    TSH: Thyroid Stimulating Hormone, Serum: 0.81 uIU/mL (02-28 @ 17:46)    PHYSICAL EXAM:  T(C): 36.6 (02-29-24 @ 09:35), Max: 36.8 (02-28-24 @ 20:54)  HR: 75 (02-29-24 @ 09:35) (52 - 75)  BP: 110/65 (02-29-24 @ 09:35) (105/64 - 139/81)  RR: 16 (02-29-24 @ 09:35) (15 - 18)  SpO2: 94% (02-29-24 @ 09:35) (94% - 99%)    Gen: Appears well in NAD  HEENT:  (-)icterus (-)pallor  CV: N S1 S2 1/6 CHIKA (+)2 Pulses B/l  Resp:  Clear to ausculatation B/L, normal effort  GI: (+) BS Soft, NT, ND  Lymph:  (-)Edema, (-)obvious lymphadenopathy  Skin: Warm to touch, Normal turgor  Psych: Appropriate mood and affect      TELEMETRY: 	SB/SR      ECG:  SB normal intervals	      ASSESSMENT/PLAN: 	63 year old male PMH BPH presents with 2 weeks of ongoing weakness, lightheadedness, dizziness, blurry vision and near syncope.  He presented here 2/23, RVP negative and labs WNL, so he received IVF and was D/C from ER. He reports symptoms did not improve    -orthostatic vitals negative  --TSH WNL  --check TTE  --Pt with HR 58 while lying in bed, upon standing and marching in place, HR appropriately yaen to 78.  No signs of chronotropic incompetence  --if TTE reveals structurally normal heart, recommend OP Holter monitoring  --consider CT Head    Pt can F/U in the office with Dr Horne 3/8/24 at 12:15 PM, [facility] [address], [place], [phone]      Dee LEAVITT  [phone]                date of consult 2/29/24    HISTORY OF PRESENT ILLNESS: HPI:    63 year old male PMH BPH presents with 2 weeks of ongoing weakness, lightheadedness, dizziness, blurry vision and near syncope.  He presented here 2/23, RVP negative and labs WNL, so he received IVF and was D/C from ER. He reports symptoms did not improve. Denies chest pain or SOB. He is concerned over heart rates in the 50s while on telemetry.  DEnies prior cardiac work up.  Denies dysuria, hematuria, fevers, chills, nausea, vomiting, diarrhea, abdominal pain, focal weakness, numbness, tingling, headaches, cough, sore throat.      PAST MEDICAL & SURGICAL HISTORY:  Diverticulitis      Oral thrush      H/O hernia repair    MEDICATIONS  (STANDING):  proscar?      Allergies    Mysoline (Rash)  Flagyl (Unknown)  ciprofloxacin (Unknown)  Bactrim (Eye Irritation)  nystatin (Hives; Rash)    FAMILY HISTORY:  Noncontributory for premature coronary disease or sudden cardiac death    SOCIAL HISTORY:    [ x] Non-smoker  [ ] Smoker  [ ] Alcohol    FLU VACCINE THIS YEAR STARTS IN AUGUST:  [ ] Yes    [ ] No    IF OVER 65 HAVE YOU EVER HAD A PNA VACCINE:  [ ] Yes    [ ] No       [ ] N/A      REVIEW OF SYSTEMS:  [ ]chest pain  [  ]shortness of breath  [  ]palpitations  [  ]syncope  [ x]near syncope [ ]upper extremity weakness   [ ] lower extremity weakness  [  ]diplopia  [  ]altered mental status   [  ]fevers  [ ]chills [ ]nausea  [ ]vomiting  [  ]dysphagia    [ ]abdominal pain  [ ]melena  [ ]BRBPR    [  ]epistaxis  [  ]rash    [ ]lower extremity edema        [x ] All others negative	  [ ] Unable to obtain      LABS:	 	    CARDIAC MARKERS:  Trop T 6, <6                        14.7   7.96  )-----------( 295      ( 28 Feb 2024 17:46 )             44.4     142  |  105  |  10  ----------------------------<  97  4.4   |  26  |  0.74    Ca    9.4      28 Feb 2024 17:46    TPro  6.8  /  Alb  4.2  /  TBili  0.5  /  DBili  x   /  AST  17  /  ALT  18  /  AlkPhos  68  02-28    Creatinine Trend: 0.74<--, 0.88<--    TSH: Thyroid Stimulating Hormone, Serum: 0.81 uIU/mL (02-28 @ 17:46)    PHYSICAL EXAM:  T(C): 36.6 (02-29-24 @ 09:35), Max: 36.8 (02-28-24 @ 20:54)  HR: 75 (02-29-24 @ 09:35) (52 - 75)  BP: 110/65 (02-29-24 @ 09:35) (105/64 - 139/81)  RR: 16 (02-29-24 @ 09:35) (15 - 18)  SpO2: 94% (02-29-24 @ 09:35) (94% - 99%)    Gen: Appears well in NAD  HEENT:  (-)icterus (-)pallor  CV: N S1 S2 1/6 CHIKA (+)2 Pulses B/l  Resp:  Clear to ausculatation B/L, normal effort  GI: (+) BS Soft, NT, ND  Lymph:  (-)Edema, (-)obvious lymphadenopathy  Skin: Warm to touch, Normal turgor  Psych: Appropriate mood and affect      TELEMETRY: 	SB/SR      ECG:  SB normal intervals	          ASSESSMENT/PLAN: 	63 year old male PMH BPH presents with 2 weeks of ongoing weakness, lightheadedness, dizziness, blurry vision and near syncope.  He presented here 2/23, RVP negative and labs WNL, so he received IVF and was D/C from ER. He reports symptoms did not improve    - orthostatic vitals negative  --TSH WNL  --check TTE  --Pt with HR 58 while lying in bed, upon standing and marching in place, HR appropriately yane to 78.  No signs of chronotropic incompetence  --if TTE reveals structurally normal heart, recommend OP Holter monitoring  --consider CT Head    Pt can F/U in the office with Dr Horne 3/8/24 at 12:15 PM, [facility] [address], [place], [phone]      Dee LEAVITT  [phone]

## 2024-02-29 NOTE — ED CDU PROVIDER DISPOSITION NOTE - NSFOLLOWUPINSTRUCTIONS_ED_ALL_ED_FT
Please follow up in the cardiology office with Dr Horne 3/8/24 at 12:15 PM  [facility] [address], [place], [phone]    Follow up with your PMD within 1-2 days or you can call our clinic at [phone] for an appointment  Take all of your other medications as previously prescribed.  Worsening, continued or ANY new concerning symptoms return to the Emergency Department.    Lightheadedness    WHAT YOU NEED TO KNOW:    Lightheadedness is the feeling that you may faint, but you do not. Your heartbeat may be fast or feel like it flutters. Lightheadedness may occur when you take certain medicines, such as medicine to lower your blood pressure. Dehydration, low sodium, low blood sugar, an abnormal heart rhythm, and anxiety are other common causes.    DISCHARGE INSTRUCTIONS:    Return to the emergency department if:    You have sudden chest pain.    You have trouble breathing or shortness of breath.    You have vision changes, are sweating, and have nausea while you are sitting or lying down.    You feel flushed and your heart is fluttering.    You faint.  Contact your healthcare provider if:    You feel lightheaded often.    Your heart beats faster or slower than usual.    You have questions or concerns about your condition or care.  Follow up with your healthcare provider as directed: You may need more tests to help find the cause of your lightheadedness. The tests will help healthcare providers plan the best treatment for you. Write down your questions so you remember to ask them during your visits.    Self-care: Talk with your healthcare provider about these and other ways to manage your symptoms:    Lie down when you feel lightheaded, your throat gets tight, or your vision changes. Raise your legs above the level of your heart.    Stand up slowly. Sit on the side of the bed or couch for a few minutes before you stand up.    Take slow, deep breaths when you feel lightheaded. This can help decrease the feeling that you might faint.    Ask if you need to avoid hot baths and saunas. These may make your symptoms worse.  Watch for signs of low blood sugar: These include hunger, nervousness, sweating, and fast or fluttery heartbeats. Talk with your healthcare provider about ways to keep your blood sugar level steady.    Check your blood pressure often: You should do this especially if you take medicine to lower your blood pressure. Check your blood pressure when you are lying down and when you are standing. Ask how often to check during the day. Keep a record of your blood pressure numbers. Your healthcare provider may use the record to help plan your treatment.    Keep a record of your lightheadedness episodes: Include your symptoms and your activity before and after the episode. The record can help your healthcare provider find the cause of your lightheadedness and help you manage episodes.

## 2024-02-29 NOTE — ED CDU PROVIDER DISPOSITION NOTE - PATIENT PORTAL LINK FT
You can access the FollowMyHealth Patient Portal offered by [facility] by registering at the following website: [email or web address]. By joining [facility]’s FollowMyHealth portal, you will also be able to view your health information using other applications (apps) compatible with our system.

## 2024-02-29 NOTE — ED CDU PROVIDER DISPOSITION NOTE - NSDCPRINTRESULTS_ED_ALL_ED
Update History & Physical    The patient's History and Physical of September 1, 2023 was reviewed with the patient and I examined the patient. There was no change. The surgical site was confirmed by the patient and me. Plan: The risks, benefits, expected outcome, and alternative to the recommended procedure have been discussed with the patient. Patient understands and wants to proceed with the procedure.      Electronically signed by Bess Goldmann, MD on 9/5/2023 at 10:55 AM Patient requests all Lab, Cardiology, and Radiology Results on their Discharge Instructions

## 2024-02-29 NOTE — ED CDU PROVIDER DISPOSITION NOTE - CLINICAL COURSE
63-year-old male with past medical history of BPH, diverticulitis, kidney stones presents to the ER complaining of 2 weeks worth of generalized weakness, fatigue, intermittent episodes of lightheadedness, intermittent episodes of blurry vision presents for the second time this week for symptoms.  Patient states 2 days ago he had an episode of left-sided chest pain radiating lasting seconds he took apple cider vinegar mixed in with water which seemed to resolve his pain.  Patient feels there is an issue with his heart, heart rate is low.  Patient also reports urinary frequency.  While in ER labs, unremarkable including troponin < 6 and normal TSH. EKG with no active signs of ischemia, and CXR with no acute findings. Pt was seen on 02/23/24 for similar sxs with negative work up including negative RVP and urine cx. CDU plan for echocardiogram, telemetry and evaluation by tele doc of the day. Pt was monitored in the CDU, no acute events on monitor, TTE grossly unremarkable, seen by tele doc, recs appreciated, stable for dc home.

## 2024-02-29 NOTE — ED CDU PROVIDER SUBSEQUENT DAY NOTE - CLINICAL SUMMARY MEDICAL DECISION MAKING FREE TEXT BOX
63-year-old male with past medical history of BPH, diverticulitis, kidney stones presents to the ER complaining of 2 weeks worth of generalized weakness, fatigue, intermittent episodes of lightheadedness, intermittent episodes of blurry vision presents for the second time this week for symptoms.  Patient states 2 days ago he had an episode of left-sided chest pain radiating lasting seconds he took apple cider vinegar mixed in with water which seemed to resolve his pain.  Patient feels there is an issue with his heart, heart rate is low.  Patient also reports urinary frequency.  While in ER labs, unremarkable including troponin < 6 and normal TSH. EKG with no active signs of ischemia, and CXR with no acute findings. Pt was seen on 02/23/24 for similar sxs with negative work up including negative RVP and urine cx. Pt was offered CDU stay on 02/23/24 but did not want to stay but is agreeable to stay today for echocardiogram, telemetry and evaluation by tele doc of the day.

## 2024-02-29 NOTE — ED CDU PROVIDER SUBSEQUENT DAY NOTE - HISTORY
63-year-old male with past medical history of BPH, diverticulitis, kidney stones presents to the ER complaining of 2 weeks worth of generalized weakness, fatigue, intermittent episodes of lightheadedness, intermittent episodes of blurry vision presents for the second time this week for symptoms.  Patient states 2 days ago he had an episode of left-sided chest pain radiating lasting seconds he took apple cider vinegar mixed in with water which seemed to resolve his pain.  Patient feels there is an issue with his heart, heart rate is low.  Patient also reports urinary frequency.  While in ER labs, unremarkable including troponin < 6 and normal TSH. EKG with no active signs of ischemia, and CXR with no acute findings. Pt was seen on 02/23/24 for similar sxs with negative work up including negative RVP and urine cx. Pt was offered CDU stay on 02/23/24 but did not want to stay but is agreeable to stay today for echocardiogram, telemetry and evaluation by tele doc of the day.    In interm, pt resting comfortably appears well, non-toxic. VSS, no acute events on tele.

## 2024-02-29 NOTE — CONSULT NOTE ADULT - NS ATTEND AMEND GEN_ALL_CORE FT
Patient seen and examined. Agree with plan as detailed in PA/NP Note.     Orthostats negative  HR improved with ambulation  Out[facility]    Lissa Madrid MD  Pager: [phone]

## 2024-03-01 ENCOUNTER — APPOINTMENT (OUTPATIENT)
Dept: ORTHOPEDIC SURGERY | Facility: CLINIC | Age: 64
End: 2024-03-01

## 2024-04-09 NOTE — ED ADULT NURSE NOTE - NSICDXFAMILYHX_GEN_ALL_CORE_FT
Pt discharged to home. 1 prescription given. Discharge paperwork discussed. All questions and concerns answered at this time. Pt awake and alert. Respirations even and unlabored.    FAMILY HISTORY:  No pertinent family history in first degree relatives

## 2024-04-26 ENCOUNTER — APPOINTMENT (OUTPATIENT)
Dept: ORTHOPEDIC SURGERY | Facility: CLINIC | Age: 64
End: 2024-04-26
Payer: OTHER MISCELLANEOUS

## 2024-04-26 VITALS — BODY MASS INDEX: 31.39 KG/M2 | WEIGHT: 200 LBS | HEIGHT: 67 IN

## 2024-04-26 DIAGNOSIS — M75.52 BURSITIS OF LEFT SHOULDER: ICD-10-CM

## 2024-04-26 DIAGNOSIS — S43.432D SUPERIOR GLENOID LABRUM LESION OF LEFT SHOULDER, SUBSEQUENT ENCOUNTER: ICD-10-CM

## 2024-04-26 DIAGNOSIS — M75.42 IMPINGEMENT SYNDROME OF LEFT SHOULDER: ICD-10-CM

## 2024-04-26 DIAGNOSIS — Z98.890 OTHER SPECIFIED POSTPROCEDURAL STATES: ICD-10-CM

## 2024-04-26 DIAGNOSIS — M75.122 COMPLETE ROTATOR CUFF TEAR OR RUPTURE OF LEFT SHOULDER, NOT SPECIFIED AS TRAUMATIC: ICD-10-CM

## 2024-04-26 PROCEDURE — 99455 WORK RELATED DISABILITY EXAM: CPT

## 2024-04-27 PROBLEM — S43.432D SUPERIOR GLENOID LABRUM LESION OF LEFT SHOULDER, SUBSEQUENT ENCOUNTER: Status: ACTIVE | Noted: 2022-06-13

## 2024-04-27 PROBLEM — M75.42 IMPINGEMENT SYNDROME OF LEFT SHOULDER: Status: ACTIVE | Noted: 2022-06-02

## 2024-04-27 PROBLEM — M75.52 BURSITIS OF LEFT SHOULDER: Status: ACTIVE | Noted: 2022-06-02

## 2024-04-27 NOTE — DISCUSSION/SUMMARY
[de-identified] : General Dx Discussion The patient was advised of the diagnosis. The natural history of the pathology was explained in full to the patient in layman's terms. All questions were answered. The risks and benefits of surgical and non-surgical treatment alternatives were explained in full to the patient.  Case discussed. Continue HEP. Follow up as needed.

## 2024-04-27 NOTE — WORK
Please submit for MRI left shoulder, order with diagnosis in patient chart. The patient provided me with his claim number while in office.     Claim number: [id number] [Partial] : partial [Can return to work with limitations on ______] : can return to work with limitations on [unfilled] [Unknown at this time] : : unknown at this time [Patient] : patient [No Rx restrictions] : No Rx restrictions. [I provided the services listed above] :  I provided the services listed above. 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 If yes, indicate date___] : Yes, on [unfilled] [Is there permanent partial impairment?] : Yes [FreeTextEntry6] : left shoulder 27.5 %  loss of abduction 20% loss of adduction 7.5% using 2018 guidelines  goiniometer used  [At other employment] : At other employment [___lbs] : Occasionally: [unfilled] lbs [] : Frequently [Medium Work] : Medium work [Has the patient had an injury/illness since the date of injury which impacts residual functional capacity?] : No

## 2024-04-27 NOTE — HISTORY OF PRESENT ILLNESS
[0] : 0 [Dull/Aching] : dull/aching [Occasional] : occasional [Leisure] : leisure [Rest] : rest [] : no [FreeTextEntry1] : Left Shoulder [de-identified] : 12/15/23 [de-identified] : Dr. Mccarty

## 2024-04-27 NOTE — PHYSICAL EXAM
[Left] : left shoulder [Standing] : standing [Trace] : trace [Right] : right shoulder [NL (0-180)] : full active forward flexion 0-180 degrees [NL (0-70)] : full internal rotation 0-70 degrees [5 ___] : forward flexion 5[unfilled]/5 [5___] : internal rotation 5[unfilled]/5 [] : negative drop-arm test [FreeTextEntry9] : adduction 15 deg ( normal 30 deg )  [TWNoteComboBox7] : active forward flexion 170 degrees [TWNoteComboBox6] : internal rotation L2 [de-identified] : active abduction 170 degrees [de-identified] : external rotation 80 degrees

## 2024-05-19 ENCOUNTER — EMERGENCY (EMERGENCY)
Facility: HOSPITAL | Age: 64
LOS: 1 days | Discharge: HOME | End: 2024-05-19
Attending: EMERGENCY MEDICINE | Admitting: EMERGENCY MEDICINE
Payer: COMMERCIAL

## 2024-05-19 VITALS
RESPIRATION RATE: 17 BRPM | TEMPERATURE: 98 F | OXYGEN SATURATION: 100 % | HEART RATE: 50 BPM | DIASTOLIC BLOOD PRESSURE: 62 MMHG | SYSTOLIC BLOOD PRESSURE: 115 MMHG

## 2024-05-19 VITALS
OXYGEN SATURATION: 100 % | DIASTOLIC BLOOD PRESSURE: 81 MMHG | RESPIRATION RATE: 18 BRPM | SYSTOLIC BLOOD PRESSURE: 128 MMHG | HEART RATE: 82 BPM | TEMPERATURE: 98 F

## 2024-05-19 DIAGNOSIS — Z98.89 OTHER SPECIFIED POSTPROCEDURAL STATES: Chronic | ICD-10-CM

## 2024-05-19 LAB
ADD ON TEST-SPECIMEN IN LAB: SIGNIFICANT CHANGE UP
ALBUMIN SERPL ELPH-MCNC: 4.1 G/DL — SIGNIFICANT CHANGE UP (ref 3.3–5)
ALP SERPL-CCNC: 83 U/L — SIGNIFICANT CHANGE UP (ref 40–120)
ALT FLD-CCNC: 24 U/L — SIGNIFICANT CHANGE UP (ref 4–41)
ANION GAP SERPL CALC-SCNC: 12 MMOL/L — SIGNIFICANT CHANGE UP (ref 7–14)
APPEARANCE UR: CLEAR — SIGNIFICANT CHANGE UP
AST SERPL-CCNC: 18 U/L — SIGNIFICANT CHANGE UP (ref 4–40)
BACTERIA # UR AUTO: NEGATIVE /HPF — SIGNIFICANT CHANGE UP
BASE EXCESS BLDV CALC-SCNC: 0.7 MMOL/L — SIGNIFICANT CHANGE UP (ref -2–3)
BASOPHILS # BLD AUTO: 0.02 K/UL — SIGNIFICANT CHANGE UP (ref 0–0.2)
BASOPHILS NFR BLD AUTO: 0.3 % — SIGNIFICANT CHANGE UP (ref 0–2)
BILIRUB SERPL-MCNC: 0.6 MG/DL — SIGNIFICANT CHANGE UP (ref 0.2–1.2)
BILIRUB UR-MCNC: NEGATIVE — SIGNIFICANT CHANGE UP
BLOOD GAS VENOUS COMPREHENSIVE RESULT: SIGNIFICANT CHANGE UP
BUN SERPL-MCNC: 10 MG/DL — SIGNIFICANT CHANGE UP (ref 7–23)
CALCIUM SERPL-MCNC: 9.3 MG/DL — SIGNIFICANT CHANGE UP (ref 8.4–10.5)
CAST: 2 /LPF — SIGNIFICANT CHANGE UP (ref 0–4)
CHLORIDE BLDV-SCNC: 103 MMOL/L — SIGNIFICANT CHANGE UP (ref 96–108)
CHLORIDE SERPL-SCNC: 103 MMOL/L — SIGNIFICANT CHANGE UP (ref 98–107)
CO2 BLDV-SCNC: 27.3 MMOL/L — HIGH (ref 22–26)
CO2 SERPL-SCNC: 24 MMOL/L — SIGNIFICANT CHANGE UP (ref 22–31)
COLOR SPEC: YELLOW — SIGNIFICANT CHANGE UP
CREAT SERPL-MCNC: 0.8 MG/DL — SIGNIFICANT CHANGE UP (ref 0.5–1.3)
DIFF PNL FLD: NEGATIVE — SIGNIFICANT CHANGE UP
EGFR: 99 ML/MIN/1.73M2 — SIGNIFICANT CHANGE UP
EOSINOPHIL # BLD AUTO: 0.05 K/UL — SIGNIFICANT CHANGE UP (ref 0–0.5)
EOSINOPHIL NFR BLD AUTO: 0.8 % — SIGNIFICANT CHANGE UP (ref 0–6)
GAS PNL BLDV: 134 MMOL/L — LOW (ref 136–145)
GAS PNL BLDV: SIGNIFICANT CHANGE UP
GLUCOSE BLDV-MCNC: 101 MG/DL — HIGH (ref 70–99)
GLUCOSE SERPL-MCNC: 98 MG/DL — SIGNIFICANT CHANGE UP (ref 70–99)
GLUCOSE UR QL: NEGATIVE MG/DL — SIGNIFICANT CHANGE UP
HCO3 BLDV-SCNC: 26 MMOL/L — SIGNIFICANT CHANGE UP (ref 22–29)
HCT VFR BLD CALC: 42.1 % — SIGNIFICANT CHANGE UP (ref 39–50)
HCT VFR BLDA CALC: 47 % — SIGNIFICANT CHANGE UP (ref 39–51)
HGB BLD CALC-MCNC: 15.5 G/DL — SIGNIFICANT CHANGE UP (ref 12.6–17.4)
HGB BLD-MCNC: 14.1 G/DL — SIGNIFICANT CHANGE UP (ref 13–17)
IANC: 3.25 K/UL — SIGNIFICANT CHANGE UP (ref 1.8–7.4)
IMM GRANULOCYTES NFR BLD AUTO: 0.2 % — SIGNIFICANT CHANGE UP (ref 0–0.9)
KETONES UR-MCNC: 40 MG/DL
LACTATE BLDV-MCNC: 1.2 MMOL/L — SIGNIFICANT CHANGE UP (ref 0.5–2)
LEUKOCYTE ESTERASE UR-ACNC: NEGATIVE — SIGNIFICANT CHANGE UP
LYMPHOCYTES # BLD AUTO: 2.2 K/UL — SIGNIFICANT CHANGE UP (ref 1–3.3)
LYMPHOCYTES # BLD AUTO: 36.7 % — SIGNIFICANT CHANGE UP (ref 13–44)
MAGNESIUM SERPL-MCNC: 2.2 MG/DL — SIGNIFICANT CHANGE UP (ref 1.6–2.6)
MCHC RBC-ENTMCNC: 28.7 PG — SIGNIFICANT CHANGE UP (ref 27–34)
MCHC RBC-ENTMCNC: 33.5 GM/DL — SIGNIFICANT CHANGE UP (ref 32–36)
MCV RBC AUTO: 85.7 FL — SIGNIFICANT CHANGE UP (ref 80–100)
MONOCYTES # BLD AUTO: 0.47 K/UL — SIGNIFICANT CHANGE UP (ref 0–0.9)
MONOCYTES NFR BLD AUTO: 7.8 % — SIGNIFICANT CHANGE UP (ref 2–14)
NEUTROPHILS # BLD AUTO: 3.25 K/UL — SIGNIFICANT CHANGE UP (ref 1.8–7.4)
NEUTROPHILS NFR BLD AUTO: 54.2 % — SIGNIFICANT CHANGE UP (ref 43–77)
NITRITE UR-MCNC: NEGATIVE — SIGNIFICANT CHANGE UP
NRBC # BLD: 0 /100 WBCS — SIGNIFICANT CHANGE UP (ref 0–0)
NRBC # FLD: 0 K/UL — SIGNIFICANT CHANGE UP (ref 0–0)
PCO2 BLDV: 43 MMHG — SIGNIFICANT CHANGE UP (ref 42–55)
PH BLDV: 7.39 — SIGNIFICANT CHANGE UP (ref 7.32–7.43)
PH UR: 5.5 — SIGNIFICANT CHANGE UP (ref 5–8)
PHOSPHATE SERPL-MCNC: 3.4 MG/DL — SIGNIFICANT CHANGE UP (ref 2.5–4.5)
PLATELET # BLD AUTO: 291 K/UL — SIGNIFICANT CHANGE UP (ref 150–400)
PO2 BLDV: 65 MMHG — HIGH (ref 25–45)
POTASSIUM BLDV-SCNC: 4 MMOL/L — SIGNIFICANT CHANGE UP (ref 3.5–5.1)
POTASSIUM SERPL-MCNC: 4.1 MMOL/L — SIGNIFICANT CHANGE UP (ref 3.5–5.3)
POTASSIUM SERPL-SCNC: 4.1 MMOL/L — SIGNIFICANT CHANGE UP (ref 3.5–5.3)
PROT SERPL-MCNC: 6.6 G/DL — SIGNIFICANT CHANGE UP (ref 6–8.3)
PROT UR-MCNC: NEGATIVE MG/DL — SIGNIFICANT CHANGE UP
RBC # BLD: 4.91 M/UL — SIGNIFICANT CHANGE UP (ref 4.2–5.8)
RBC # FLD: 14.7 % — HIGH (ref 10.3–14.5)
RBC CASTS # UR COMP ASSIST: 0 /HPF — SIGNIFICANT CHANGE UP (ref 0–4)
SAO2 % BLDV: 93.5 % — HIGH (ref 67–88)
SODIUM SERPL-SCNC: 139 MMOL/L — SIGNIFICANT CHANGE UP (ref 135–145)
SP GR SPEC: 1.01 — SIGNIFICANT CHANGE UP (ref 1–1.03)
SQUAMOUS # UR AUTO: 0 /HPF — SIGNIFICANT CHANGE UP (ref 0–5)
UROBILINOGEN FLD QL: 0.2 MG/DL — SIGNIFICANT CHANGE UP (ref 0.2–1)
WBC # BLD: 6 K/UL — SIGNIFICANT CHANGE UP (ref 3.8–10.5)
WBC # FLD AUTO: 6 K/UL — SIGNIFICANT CHANGE UP (ref 3.8–10.5)
WBC UR QL: 1 /HPF — SIGNIFICANT CHANGE UP (ref 0–5)

## 2024-05-19 PROCEDURE — 99285 EMERGENCY DEPT VISIT HI MDM: CPT

## 2024-05-19 PROCEDURE — 74177 CT ABD & PELVIS W/CONTRAST: CPT | Mod: 26,MC

## 2024-05-19 RX ORDER — SODIUM CHLORIDE 9 MG/ML
1000 INJECTION INTRAMUSCULAR; INTRAVENOUS; SUBCUTANEOUS ONCE
Refills: 0 | Status: COMPLETED | OUTPATIENT
Start: 2024-05-19 | End: 2024-05-19

## 2024-05-19 RX ADMIN — SODIUM CHLORIDE 1000 MILLILITER(S): 9 INJECTION INTRAMUSCULAR; INTRAVENOUS; SUBCUTANEOUS at 09:31

## 2024-05-19 NOTE — ED PROVIDER NOTE - PROGRESS NOTE DETAILS
Quan NANCE: Pt is stable and ready for discharge. Strict return precautions given. All questions answered. Pt's symptoms have improved.

## 2024-05-19 NOTE — ED PROVIDER NOTE - HIV OFFER
ONCOLOGY/HEMATOLOGY OFFICE FOLLOW-UP    CHIEF COMPLAINT:   Mr. Verduzco returned for follow-up evaluation and management of liposarcoma.    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    Ultrasound of the gallbladder on 10/10/11 showed a thickened and irregular gallbladder but also a partially visualized mass in the right upper abdomen posterior to the kidney. CT scan of the abdomen on 10/14/11 showed a mass with soft tissue density adjacent to the posterior mid-to-lower right kidney measuring approximately 5.0 x 3.0 x 5.0 cm, as well as an up to 4 cm fat collection in the right upper quadrant beneath the colonic hepatic flexure with a thin capsule. Biopsy of the right retroperitoneal mass on 11/9/11 showed minute fragments of fibrous and mature adipose tissue with comment from Pathology that it could represent a lipomatous tumor, but due to limited biopsy material an unequivocal diagnosis could not be rendered.     Repeat abdominal CT scan on 1/9/12 was stable and surveillance CT scan of the abdomen on 7/30/12 showed slight growth of the primary mass to 3.6 x 5.3 x 5.6 cm (from 3.1 x 4.9 x 6.0 cm) with a 3.7 x 3.0 cm mass in the right mid-abdomen. Abdominal CT scan on 719/13 sized the main mass at 3.2 x 5.0 x 5.7 cm (from 3.6 x 5.3 x 5.3 cm) with a 2.6 x 4.2 x 3.7 cm fluid collection inferior to the right kidney (from 2.6 x 3.7 x 3.6 cm) and sized the mildly complex fatty mass inferior to the transverse colon at 3.1 x 3.9 x 3.2 cm (from 3.3 x 4.0 x 2.9 cm).     Right renal ultrasound on 6/16/14  sized the hypoechoic nonvascular retroperitoneal mass at 5.7 x 4.2 x 4.4 cm (from 5.7 x 5 x 3.7 cm on previous CT scan). Repeat right renal ultrasounds on 6/13/16 and 8/16/17 appeared stable. Right renal ultrasound on 9/18/19 showed diminution in size of the primary mass to 3.8 x 3.4 x 2.6 cm (from 5.8 x 2.2 x 3.5 cm).    In 5/2021, the patient developed a feeling of fullness in the abdomen. CT scan of the abdomen on 5/11/21 showed significant growth of the heterogeneous soft tissue mass within the right hemiabdomen to 9.4 x 14.0 x 18.1 cm. 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 The patient consulted with Dr. Monique in Surgical Oncology on 5/27/21. Renogram on 6/4/21 was unremarkable. Chest CT scan on 6/4/21 was negative for any pulmonary lesions, lymphadenopathy, or other evidence of metastatic disease.    The patient was discussed at [facility] and seen in [facility] on 6/7/21. Dr. Sarbjit Pickett administered neoadjuvant radiation therapy consisting of 5,040 cGy in 28 fractions from 6/28/21 to 8/5/21.    CT scan of the chest, abdomen, and pelvis with contrast on 8/24/21 showed stable appearance of the right abdominal mass causing local mass effects including partial encasement of the ascending colon with slight shrinkage of right retroperitoneal lesions but long segment colonic wall thickening, new scattered pulmonary micronodules, and more conspicuous appearance of a subcentimeter hepatic hypodensity.     On 9/22/21, Dr. Monique performed en bloc resection of right retroperitoneal liposarcoma with en bloc right colon resection and Dr. Alex Lee performed open right nephrectomy and right partial adrenalectomy. 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 [facility] next generation gene sequencing identified CDK4 amplification, MDM2 amplification, and PD-L1 expression with  but no microsatellite instability.     PET/CT scan from vertex to toes on 11/30/21 showed post-operative changes with anastomotic uptake, indeterminate irregular appearing soft tissue in the right paravertebral region with mild uptake, asymmetric muscular uptake in the right iliopsoas and anterior abdominal wall, an indeterminate left scapular lesion with max SUV 1.9, a stable 1.3 cm left thyroid nodule without uptake, stable lung nodules measuring up to 3 mm, nonspecific calcifications in the prostate gland, and numerous likely bone islands.    Non-contrast CT scan of the chest, abdomen, and pelvis on 2/28/22 showed multiple lung nodules measuring up to 2 mm, a 1.2 cm probable exophytic left renal cyst, and stable appearance of the small lucency involving the left scapular spine/acromion. CT scan of the chest, abdomen, and pelvis without contrast on 6/2/22 showed multiple stable tiny 2-3 mm bilateral lung nodules and a stable small lucency in the spine of the left scapula.     CT scan of the chest, abdomen, and pelvis with contrast on 8/31/22 showed a new 4 mm ill-defined nodule at the deep left lung base appearing to lie along a band of atelectasis, multiple stable tiny lung nodules, a stable 9 mm left thyroid nodule, a stable 3 mm low-attenuation hepatic dome lesion, slight growth of a left renal cyst to 14 x 13 mm with other stable cysts, a stable 6 mm nodule inferior to the spleen, a stable 4.6 mm nodular area in the deep anterior right pelvis, stable sclerotic bone lesions, and stable surgical changes. CT scan of the chest, abdomen, and pelvis without contrast on 11/30/22 showed stable lung nodules, stable 9 mm left thyroid  nodule, and stable osseous lesions with new air collections along the right diaphragmatic francisco.     He was evaluated for left hip pain with x-rays on 3/1/23 showing severe osteoarthritis hip with severe superior joint space narrowing and large osteophytes.     CT scan of the chest, abdomen, and pelvis without contrast on 4/3/23 was stable including pulmonary micronodules and soft tissue attenuation within the surgical bed of the right upper quadrant corresponding to loops of closely approximated small bowel.    The patient presents for an office follow-up feeling well overall. ECOG performance status is 1. Body weight is stable (previously blane 0.4, 2.3, 1.1, 1.9 and 1.4 kg). Since last seen he has had left hip pain.  He denies any bleeding diathesis including hematochezia, melena, hematuria, epistaxis, hematemesis, hemoptysis, or gingival bleeding. He also denies any recent fever, chills, sweats, chest pain, shortness of breath, dyspnea on exertion, dizziness, lightheadedness, palpitations, focal weakness, vision change, or confusion. There is no cough, sputum production, nausea, vomiting, diarrhea, or constipation.    Past medical history, family history, social history, allergies and medications have all been reviewed as documented in the Epic system, and I agree with them and are updated.     REVIEW OF SYSTEMS:   Apart from that described in the review of systems as above in the History of Present Illness, the remainder of the review of systems is documented in the Epic system, and I agree with them.     PHYSICAL EXAMINATION:   Vitals:    Visit Vitals  /70 (Patient Position: Sitting)   Pulse 78   Temp 98.4 °F (36.9 °C)   Resp 18   Wt 86.8 kg (191 lb 7.5 oz)   SpO2 98%   BMI 25.26 kg/m²      Constitutional: Well developed, well nourished, no acute distress, non-toxic appearance.  Eyes: Anicteric sclerae, conjunctivae normal.  HENT: Atraumatic, oropharynx moist, no pharyngeal exudates.   Neck: Supple. No  palpable adenopathy in the neck. No subcutaneous nodules.  No thyroid enlargement.  Respiratory: No respiratory distress, normal breath sounds, no rales, no wheezing. No accessory muscles of respiration.   Cardiovascular: Normal rate, normal rhythm, no murmurs, no gallops, no rubs.  GI: Soft, non-distended, +bowel sounds, healed midline incision with mild tenderness, no rebound, no guarding.  : No costovertebral angle tenderness.  Musculoskeletal: did not examine left hip  Back- no tenderness.  Lymphatic: No adenopathy in the supraclavicular, axillary or inguinal regions.   Neurologic: Alert and oriented x 3, sensation intact, no focal motor deficits.  Extremities: No pretibial or ankle edema.  Psychiatric: Speech and behavior appropriate. Affect was pleasant.   Skin/Mucosa: Examination of skin and mucosa reveal no evidence of rash or petechiae or subcutaneous nodules.       LABORATORY STUDIES:  Recent Labs   Lab 04/13/23  0909 12/08/22  0849 09/09/22  0917 06/10/22  0902   WBC 4.6 4.5 5.2 5.8   RBC 3.80* 3.87* 4.06* 3.90*   HGB 12.5* 12.6* 12.9* 12.6*   HCT 37.2* 37.8* 39.5 38.3*   MCV 97.9 97.7 97.3 98.2    219 201 200   Absolute Neutrophils 3.2 3.0 3.7 4.3   Absolute Lymphocytes 0.7* 0.7* 0.6* 0.6*   Absolute Monocytes 0.4 0.5 0.4 0.5   Absolute Eosinophils  0.3 0.3 0.3 0.3   Absolute Basophils 0.0 0.0 0.0 0.0     Recent Labs   Lab 04/13/23  0909 03/31/23  0831 12/08/22  0849 09/09/22  0917 06/10/22  0902   Glucose 77 100* 64* 91 82   Sodium 143 141 138 139 140   Potassium 4.5 4.9 4.7 4.7 4.5   Chloride 106 104 102 102 104   BUN 27* 28* 30* 29* 25*   Creatinine 1.95* 1.75* 1.74* 1.98* 1.62*   Calcium 8.6 8.6 8.5 8.8 8.9   Protein, Total 6.6  --  6.9 7.1 7.0   Albumin 3.3* 3.5* 3.4* 3.5* 3.5*   GOT/AST 19  --  22 22 24   Alkaline Phosphatase 90  --  93 97 105   GPT 25  --  24 31 32     Recent Labs   Lab 04/13/23  0909 03/31/23  0831 12/08/22  0849 09/09/22  0917 06/10/22  0902   Anion Gap 11 11 11 10 12    Globulin 3.3  --  3.5 3.6 3.5   Bilirubin, Total 0.5  --  0.6 0.6 0.6     Iron (mcg/dL)   Date Value   06/09/2022 70     Iron Binding Capacity (mcg/dL)   Date Value   06/09/2022 336     Iron, Percent Saturation (%)   Date Value   06/09/2022 21     Ferritin (ng/mL)   Date Value   06/09/2022 40       Vitamin B12 (pg/mL)   Date Value   06/09/2022 847       Component      Latest Ref Rng & Units 6/9/2022   FOLATE      >=5.5 ng/mL >24.0     PSA, Total (ng/mL)   Date Value   09/16/2019 1.26   08/23/2018 1.33   08/11/2017 1.31     Prostate Specific Antigen (ng/mL)   Date Value   02/10/2022 2.31   02/12/2021 2.45        RADIOLOGICAL DATA REVIEWED:   CT ABDOMEN WO W  CONTRAST  Result Date: 5/11/2021  IMPRESSION: Indeterminate 6 right abdominal mass, significantly increased in size from 2013 measuring 9.4 x 14.0 x 18.1 cm. Lesion is heterogeneous in attenuation, and contains areas of macroscopic fat. This is concerning for a liposarcoma. Correlate with prior biopsy results. If unavailable, tissue sampling should be considered. Significantly increased right retroperitoneal soft tissue mass along the posteromedial aspect of the right kidney. This is favored to be related to the process within the right hemiabdomen.     NM RENOGRAM FLOW AND FUNCTION  Result Date: 6/4/2021  IMPRESSION:  1.  Essentially unremarkable renogram study. 2.  The percentage of cortical uptake on the right was 51%, and on the left was 49%.     CT CHEST W CONTRAST  Result Date: 6/4/2021  IMPRESSION: 1. No metastatic disease the chest. No pulmonary lesions or lymphadenopathy. 2. Partial visualization of upper abdominal retroperitoneal mass lesion. Please refer to previous CT of 5/11/2021.     CT scan of the chest, abdomen, and pelvis with contrast on 8/24/21:  COMBINED IMPRESSION:   Essentially stable size of the dominant large heterogeneously enhancing right abdominal mass which abuts multiple adjacent structures with associated mass effect and  displacement as detailed above.  Remonstration of multiple right retroperitoneal soft tissue masses/lesions surrounding the right kidney which have slightly intervally decreased in size.  Findings concerning for probable metastatic disease involving the ascending colon with long segment thickening and partial encasement from the adjacent right abdominal mass. No bowel obstruction.  Multiple scattered pulmonary micronodules, new since the previous study with metastatic disease not excluded. Attention on follow-up imaging is recommended for reassessment.  Subcentimeter hepatic hypodensity which was possibly present on the previous, though appears slightly more conspicuous on the current study. This is too small to accurately characterize. Attention on follow-up imaging.    PET CT FDG WHOLE BODY (VERTEX TO TOES)  Result Date: 12/2/2021  IMPRESSION: 1.  Post surgical changes of abdominal mass resection, right nephrectomy, and resection of retroperitoneal lesions. 2.  Ill-defined soft tissue in the right paravertebral renal fossa demonstrate uptake similar to adjacent bowel, otherwise not well evaluated on noncontrast low-dose CT. This could potentially relate to postsurgical change or loops of adjacent bowel, though again is indeterminate. This would be better evaluated with contrast-enhanced imaging. 3.  Uptake at the surgical anastomosis in the right upper quadrant bowel may simply be postsurgical or physiologic though given proximity of the mass to the bowel previously, any underlying disease would be difficult to exclude. 4.  Tiny pulmonary nodules, below resolution for PET/CT. Continued surveillance recommended. 5.  Indeterminate lucent left scapular lesion with mild uptake, indeterminate. Underlying lesion/metastasis not excluded. 6.  Thyroid nodules without focal uptake. Consider ultrasound. 7.  Asymmetric muscular uptake in the right iliopsoas and anterior abdominal wall, possibly posttreatment related. This is  nonfocal.     CT CHEST ABDOMEN PELVIS WO CONTRAST  Result Date: 3/1/2022  IMPRESSION: 1. Surgical changes from right-sided tumor mass excision.  No convincing findings to suggest residual/recurrent local disease. 2. Few tiny pulmonary nodules, most of which appear calcified. No noncalcified nodules greater than 2 mm, likely incidental. 3. Stable lucent lesion involving the left scapular spine/acromion. Finding remains nonspecific, though solitary osseous metastatic lesion would be unlikely.     CT CHEST ABDOMEN PELVIS WO CONTRAST  Result Date: 6/3/2022  IMPRESSION: Stable CT scan of the chest abdomen pelvis. A few tiny nonspecific bilateral pulmonary lymph nodules measuring less than 3 mm. Stable small lucency in the spine of the left scapula.      CT CHEST ABDOMEN PELVIS WO CONTRAST  Result Date: 9/2/2022  IMPRESSION: 1.  Stable surgical changes with no evidence for local recurrence allowing for the lack of contrast 2.  4 mm nodule at the deep left lung base lies along an area of atelectasis. This may not be clinically significant, but recommend short-term follow-up 3.  No new metastatic disease appreciated in the abdomen and pelvis. 4.  The known scapular lesion is less well-seen. 5.  There is a 9 mm left thyroid nodule which could be assessed with ultrasound.      CT CHEST ABDOMEN PELVIS WO CONTRAST  Result Date: 12/2/2022  IMPRESSION:  1. Bowel remains present in the right renal fossa. New air collections along the right diaphragmatic francisco that may represent air within matted down bowel versus fistulous communication or infectious/inflammatory process. This region is incompletely evaluated due to lack of IV contrast. Consider contrast enhanced CT for further evaluation. PET/CT can be considered if there is concern for recurrent/residual disease. 2. Stable osseous lesions as outlined above. 3. Stable nonspecific punctate pulmonary nodules. 4. Stable 9 mm left thyroid nodule. Consider thyroid ultrasound for  Previously Declined (within the last year) further evaluation.      CT CHEST ABDOMEN PELVIS WO CONTRAST  Result Date: 4/6/2023  CT CHEST ABDOMEN PELVIS WO CONTRAST HISTORY: History of dedifferentiated liposarcoma of the right retroperitoneum status post radiation therapy and resection 9/20/2021. COMPARISON: CT chest abdomen pelvis 11/30/2022. TECHNIQUE: Axial CT images of the chest, abdomen, and pelvis without IV contrast. Water was administered as oral contrast. Multiplanar reformats with axial MIP images of the chest. FINDINGS: Examination of the visceral organs and vascular structures is limited by the lack of IV contrast. LOWER NECK: Similar subcentimeter left thyroid nodule. CHEST: Lungs and pleura: No consolidation or pleural effusion. Mild curvilinear atelectasis/scarring within the right lower lobe. The central airways are clear. Stable small pulmonary micronodules and small calcified granulomas. No new or enlarging pulmonary nodule. No dominant nodule. Pearl/mediastinum:  Unremarkable. Heart:  The heart is normal in size. No pericardial effusion. Multivessel coronary calcifications. Aortic valvular calcification. Vascular:  Unremarkable. Axilla/supraclavicular regions:  Unremarkable. ABDOMEN: Liver:  Unremarkable. Biliary system:  Unremarkable. Spleen:  Unremarkable. Pancreas:  Unremarkable. Adrenal glands:  Partial right adrenalectomy. The left are shown gland is within normal limits. Kidneys:  The right kidney is surgically absent. There is a 1.4 cm exophytic cyst arising from the posterior lower pole the left kidney. No hydronephrosis. Bowel: Post surgical changes of the bowel. No suspicious findings are evident at the anastomoses to suggest disease recurrence. Mild uncomplicated colonic diverticulosis. Retroperitoneum/mesentery:  Postsurgical changes of prior resection of right retroperitoneal liposarcoma. Similar ill-defined soft tissue within the right upper quadrant inferior to the right adrenal gland on the right diaphragmatic francisco and  posterior to the right hepatic lobe. Most of this soft tissue density appears to correspond to closely approximated small bowel loops within the right upper quadrant and coronal reformat. No new or enlarging soft tissue is identified within this area. No free air. No new or enlarging lymphadenopathy. Vascular:  Scattered aortoiliac vascular calcifications. OSSEOUS STRUCTURES/SUBCUTANEOUS TISSUES:  Stable small sclerotic lesion within the right glenoid. Subtle lucency within the medial left clavicle is unchanged (3/30). No new or enlarging suspicious osseous lesions. Degenerative changes of the spine. PELVIS: Unremarkable.   IMPRESSION: 1.  History of right retroperitoneal dedifferentiated liposarcoma status post radiation therapy and resection. 2.  Soft tissue attenuation within the surgical bed of the right upper quadrant is again identified and appears to mainly correspond to loops of closely approximated small bowel within the right upper quadrant. No new or enlarging soft tissue abnormality compared to 11/30/2022. 3.  Stable pulmonary micronodules. No new or enlarging pulmonary nodules. 4.  Stable small nonspecific bone lesions as described.      ASSESSMENT:  Jorge Verduzco is a 73 year old male with the following problems and, after discussion with the patient, we have agreed upon the following plan:    1. Dedifferentiated liposarcoma, ypT4 ypN0 cM0  - Ultrasound of the gallbladder on 10/10/11 partially visualized a mass in the right upper abdomen posterior to the kidney  - CT scan of the abdomen on 10/14/11 showed a mass with soft tissue density adjacent to the posterior mid-to-lower right kidney measuring approximately 5.0 x 3.0 x 5.0 cm, as well as an up to 4 cm fat collection in the right upper quadrant beneath the colonic hepatic flexure with a thin capsule  - Biopsy of the right retroperitoneal mass on 11/9/11 showed minute fragments of fibrous and mature adipose tissue with comment that it could  represent a lipomatous tumor, but due to limited biopsy material an unequivocal diagnosis could not be rendered  - CT scan of the abdomen on 1/9/12 was stable   - CT scan of the abdomen on 7/30/12 showed slight growth of the primary mass to 3.6 x 5.3 x 5.6 cm (from 3.1 x 4.9 x 6.0 cm) with a 3.7 x 3.0 cm mass in the right mid-abdomen  - CT scan of the abdomen on 719/13 sized the main mass at 3.2 x 5.0 x 5.7 cm (from 3.6 x 5.3 x 5.3 cm) with a 2.6 x 4.2 x 3.7 cm fluid collection inferior to the right kidney (from 2.6 x 3.7 x 3.6 cm) and sized the mildly complex fatty mass inferior to the transverse colon at 3.1 x 3.9 x 3.2 cm (from 3.3 x 4.0 x 2.9 cm)  - Right renal ultrasound on 6/16/14 sized the hypoechoic nonvascular retroperitoneal mass at 5.7 x 4.2 x 4.4 cm (from 5.7 x 5 x 3.7 cm)  - Right renal ultrasounds on 6/13/16 and 8/16/17 appeared stable  - Right renal ultrasound on 9/18/19 showed diminution in size of the primary mass to 3.8 x 3.4 x 2.6 cm (from 5.8 x 2.2 x 3.5 cm)  - CT scan of the abdomen on 5/11/21 showed significant growth of the heterogeneous soft tissue mass within the right hemiabdomen to 9.4 x 14.0 x 18.1 cm  - Core biopsies on 5/21/21 revealed well differentiated liposarcoma with FISH positive for MDM2 amplification  - Renogram on 6/4/21 showed normal split function  - Chest CT scan on 6/4/21 was negative for any evidence of metastatic disease  - Discussed at [facility] and seen in [facility] on 6/7/21  - Status post neoadjuvant radiation therapy consisting of 5,040 cGy in 28 fractions from 6/28/21 to 8/5/21  - CT scan of the chest, abdomen, and pelvis with contrast on 8/24/21 showed stable appearance of the right abdominal mass causing local mass effects including partial encasement of the ascending colon with slight shrinkage of right retroperitoneal lesions but long segment colonic wall thickening, new scattered pulmonary micronodules, and more conspicuous  appearance of a subcentimeter hepatic hypodensity  - Status post en bloc resection of right retroperitoneal liposarcoma with en bloc right colon resection, open right nephrectomy, and right partial adrenalectomy on 9/22/21. 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), FNCLCC grade 2, with well-differentiated liposarcoma focally involving the ureteral margin and benign results from the omentum, falciform ligament, and one lymph node from the inferior vena cava margin  - Restaging PET/CT scan on 11/30/21 showed post-treatment changes with nonspecific findings including muscular uptake, a left scapular lesion, stable thyroid nodules, and stable lung nodules but no clear evidence of FDG avid neoplastic disease  - [facility] next generation gene sequencing identified CDK4 amplification suggesting potential benefit from abemaciclib, and showed strong PD-L1 expression with   - Non-contrast CT scan of the chest, abdomen, and pelvis on 2/28/22 showed multiple lung nodules measuring up to 2 mm, a 1.2 cm probable exophytic left renal cyst, and stable appearance of the small lucency involving the left scapular spine/acromion  - Non-contrast CT scan of the chest, abdomen, and pelvis on 6/2/22 showed stability of 2-3 mm bilateral lung nodules and the small lucency involving the left scapular spine/acromion with no evidence of recurrent or metastatic disease  - Non-contrast CT scan of the chest, abdomen, and pelvis on 8/31/22 showed stable surgical changes and one new 4 mm left lower lobe lung nodule  - Non-contrast CT scan of the chest, abdomen, and pelvis on 11/30/22 was stable except for new air collections along the right diaphragmatic francisco representing air within matted down bowel versus fistulous communication or infectious/inflammatory process  - Non-contrast CT scan of the chest, abdomen, and pelvis on 4/3/23 was stable including pulmonary micronodules and  soft tissue attenuation within the surgical bed of the right upper quadrant corresponding to loops of closely approximated small bowel   2. Anemia  - Mild, normocytic  - Normal iron/TIBC, ferritin, vitamin B12 and folate on 6/9/22  - Likely due to chronic disease  3. 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 Labs, imaging, and pathology were personally reviewed with the patient.  2. 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 The lung nodule will be monitored for malignant potential but the air collections are more likely from air in a small bowel loop given the lack symptoms.  3. After careful explanation of all possible risks and potential benefits, the patient has agreed to continue observation.   4. Follow up with Dr. Monique in Surgical Oncology and Dr. Lee in Urology as indicated.  5. Follow up with Dr. Pickett in Radiation Oncology as indicated  6. Continue to optimize renal function with Nephrology follow-up with Dr. Benites, greatly appreciated.  7. Continue age-appropriate cancer screenings including continued colonoscopy surveillance of colonic polyposis given strong family history of colon cancer.  8. [facility] next generation gene sequencing identified CDK4 amplification and the [facility] agreed that palbociclib would be a strong treatment option.  9. Ordering non-contrast CT scan of the chest, abdomen, and pelvis in 4 months  10. If he develops evidence of disease progression, will request insurance pre-authorization for palbociclib 125  mg once daily for 21 days, followed by 7 days off, repeat every 28 days based on YEISON Oncol 2016 Jul 1;2(7):042-54 and Crit Rev Oncol Hematol 2020 Jun 18;153:[id number] and also based on next generation sequencing results showing CDK4 amplification   11. Return for follow-up with labs on 8/3/23 with labs (ordered CBC, CMP)  12. I will continually assess the patient's progress off of treatment, and make changes as feasible. Advised regular use of physical therapy, graduated exercise with adequate oral hydration, and well-balanced nutrition. All risks and benefits were discussed in detail with the patient, who expresses understanding and agreement with the plan.        Counseling and coordination:    I have discussed my findings and further recommendations with the patient and his wife and answered all questions and he has verbalized understanding and is in agreement. I spent 30 minutes on the encounter with over half in face-to-face evaluation and the majority of the time being counseling and coordination, review of data and answering questions and discussions.       Thank you, Dr. Monique, for allowing me to participate in the care of this patient. Please do not hesitate to page me at [phone] with any questions or concerns.    Michael Ibarra MD

## 2024-05-19 NOTE — ED PROVIDER NOTE - OBJECTIVE STATEMENT
65 y/o male w/ PMH diverticulitis, BPH c/o continued 10 day history of LLQ localized abdominal pain despite 10 days of PO abxs use, 1 day history of constipation (last bowel movement is yesterday), and inability to urinate for the past day. Pt is otherwise asymptomatic at this time. Denies fevers, chills, nausea, vomiting, dizziness, chest pain, SOB, abdominal pain, dysuria, hematuria.

## 2024-05-19 NOTE — ED PROVIDER NOTE - PATIENT PORTAL LINK FT
You can access the FollowMyHealth Patient Portal offered by [facility] by registering at the following website: [email or web address]. By joining [employer]’s FollowMyHealth portal, you will also be able to view your health information using other applications (apps) compatible with our system.

## 2024-05-19 NOTE — ED ADULT TRIAGE NOTE - CHIEF COMPLAINT QUOTE
Pt c/o diverticulitis flare up x12 days, finished course of abx with no relief. Endorses 1 day of constipation. Denies vomiting/bloody stools, fevers/chills, hematuria.

## 2024-05-19 NOTE — ED PROVIDER NOTE - PHYSICAL EXAMINATION
GENERAL: NAD  HEENT:  Atraumatic  CHEST/LUNG: Chest rise equal bilaterally  HEART: Regular rate and rhythm  ABDOMEN: Soft, Nontender, Nondistended  EXTREMITIES:  Extremities warm  PSYCH: A&Ox3  SKIN: No obvious rashes or lesions  MSK: No cervical spine TTP, able to range neck to the left and right  NEUROLOGY: strength and sensation intact in all extremities. Ambulatory without difficulty.

## 2024-05-19 NOTE — ED PROVIDER NOTE - ATTENDING CONTRIBUTION TO CARE
Attending Statement: I have personally seen and examined this patient. I have fully participated in the care of this patient. I have reviewed all pertinent clinical information, including history physical exam, plan and the Resident's note and agree except as noted  64-year-old male history of BPH, history of diverticulitis presents from home chief complaint of abdominal pain and constipation.  States he was diagnosed with diverticulitis on May 4 at urgent care due to abdominal discomfort.  Had no labs or imaging done at that time.  Completed 10-day course of Augmentin.  States that he still having intermittent abdominal discomfort located in the lower left with constipation.  No diarrhea.  No melena no rectal bleed.  No vomiting.  Also has noted that his urine is "coming out slowly" but has been able to void" no dysuria no foul-smelling urine or hematuria.  No fever no chills.  No chest pain or shortness of breath.  Vital signs noted.  Patient nontoxic ANO x 3.  Abdomen is soft nondistended mild tenderness to the left lower quadrant with no rebound no guarding no CVA tenderness.  Plan n.p.o., labs, urine, CT abdomen pelvis, pain meds as needed and reassess

## 2024-05-19 NOTE — ED PROVIDER NOTE - CLINICAL SUMMARY MEDICAL DECISION MAKING FREE TEXT BOX
65 y/o male w/ PMH diverticulitis, BPH c/o continued 10 day history of LLQ localized mild abdominal pain despite 10 days of PO abxs use, 1 day history of constipation (last bowel movement is yesterday), and inability to urinate for the past day. Pt is otherwise asymptomatic at this time. Denies fevers, chills, nausea, vomiting, dizziness, chest pain, SOB, abdominal pain, dysuria, hematuria. Benign abdominal examination, no CVA TTP b/l, well appearing, low concern for acute intraabdominal surgical pathology at this time. Bladder scan reveals urine present and pt does not need to urinate at this time. UA/UC for UTI, Avitia catheter and reassess w/ potential d/c w/ close PCP f/u.

## 2024-05-20 LAB
CULTURE RESULTS: NO GROWTH — SIGNIFICANT CHANGE UP
SPECIMEN SOURCE: SIGNIFICANT CHANGE UP

## 2024-05-27 ENCOUNTER — EMERGENCY (EMERGENCY)
Facility: HOSPITAL | Age: 64
LOS: 1 days | Discharge: HOME | End: 2024-05-27
Attending: EMERGENCY MEDICINE | Admitting: EMERGENCY MEDICINE
Payer: COMMERCIAL

## 2024-05-27 VITALS
OXYGEN SATURATION: 96 % | DIASTOLIC BLOOD PRESSURE: 65 MMHG | TEMPERATURE: 98 F | HEART RATE: 66 BPM | RESPIRATION RATE: 16 BRPM | WEIGHT: 160.06 LBS | HEIGHT: 67 IN | SYSTOLIC BLOOD PRESSURE: 118 MMHG

## 2024-05-27 DIAGNOSIS — Z98.89 OTHER SPECIFIED POSTPROCEDURAL STATES: Chronic | ICD-10-CM

## 2024-05-27 PROCEDURE — 99284 EMERGENCY DEPT VISIT MOD MDM: CPT

## 2024-05-27 RX ORDER — LIDOCAINE HCL 20 MG/ML
10 VIAL (ML) INJECTION ONCE
Refills: 0 | Status: DISCONTINUED | OUTPATIENT
Start: 2024-05-27 | End: 2024-05-30

## 2024-05-27 NOTE — ED ADULT NURSE NOTE - CHIEF COMPLAINT QUOTE
Pt seen at [facility], rouse catheter placed 5/26. Pt endorsing redness and discomfort to the penile region. Past medical history of constipation and enlarged prostate.

## 2024-05-27 NOTE — ED PROVIDER NOTE - NS ED ROS FT
Constitutional:  (-) fever, (-) chills, (-) lethargy  Eyes:  (-) eye pain (-) visual changes  ENMT: (-) nasal discharge, (-) sore throat. (-) neck pain or stiffness  Cardiac: (-) chest pain (-) palpitations  Respiratory:  (-) cough (-) respiratory distress.   GI:  (-) nausea (-) vomiting (-) diarrhea (-) abdominal pain.  :  (-) dysuria (-) frequency (-) burning (+) penile pain  MS:  (-) back pain (-) joint pain.  Neuro:  (-) headache (-) numbness (-) tingling (-) focal weakness  Skin:  (-) rash  Except as documented in the HPI,  all other systems are negative

## 2024-05-27 NOTE — ED PROVIDER NOTE - IV ALTEPLASE INCLUSION HIDDEN
normal,  alert,  in no acute distress,  well developed, well nourished,  ambulating without difficulty,  normal communication ability show

## 2024-05-27 NOTE — ED ADULT NURSE NOTE - OBJECTIVE STATEMENT
Pt arrives to ED spot 26 c/o 1-day onset of penile discomfort from the urinary catheter. Reports that he had a rouse catheter placement yesterday at [facility] for urinary retention. Pt also endorsing poor placement of sticker, which is also causing him discomfort. He denies any penile pain at this time. Rouse is draining appropriately, bright yellow urine noted.

## 2024-05-27 NOTE — ED PROVIDER NOTE - PROGRESS NOTE DETAILS
Anatoly PGY3: rouse functioning appropriatted. will be discharged. patient has urology follow up appointment on June 11.

## 2024-05-27 NOTE — ED PROVIDER NOTE - CLINICAL SUMMARY MEDICAL DECISION MAKING FREE TEXT BOX
64 y M, hx of BPH, p/w 1-day onset of penile discomfort from the rouse catheter. Reports that he had a rouse catheter placement yesterday at an OSH for urinary retention. He would like to replace the leg sticker for the catheter. He denies any penile pain at this time. Rouse is draining appropriately. No fever, abdominal pain, back pain. He has a follow up appointment with his urologist on June 11. VSWNL on arrival. PE as noted above was unremarkable. Normal  exam. Patient would like to try transurethral lidocaine. Gilbert maurer.

## 2024-05-27 NOTE — ED ADULT NURSE NOTE - NSICDXPASTMEDICALHX_GEN_ALL_CORE_FT
PAST MEDICAL HISTORY:  Diverticulitis     Oral thrush     
no dysuria, no frequency, and no hematuria.

## 2024-05-27 NOTE — ED PROVIDER NOTE - ATTENDING CONTRIBUTION TO CARE
DR. HAND, ATTENDING MD-  I performed a face to face bedside interview with the patient regarding history of present illness, review of symptoms and past medical history. I completed an independent physical exam.  I have discussed the patient's plan of care with the resident.   Documentation as above in the note.    65 y/o male recent rouse placed 5/26 for retention here with discomfort at tip of penis and states that sticker anchoring rouse is losing adhesive capabilities.  Likely irritation d/t rouse, will give lidojet, replace rouse sticker, dc with uro f/u as o/p.

## 2024-05-27 NOTE — ED ADULT TRIAGE NOTE - CHIEF COMPLAINT QUOTE
Pt seen at [facility], rouse catheter placed 5/26. Pt endorsing redness and discomfort to the penile region. Past medical history of constipation and enlarged prostate.

## 2024-05-27 NOTE — ED PROVIDER NOTE - PATIENT PORTAL LINK FT
You can access the FollowMyHealth Patient Portal offered by [facility] by registering at the following website: [email or web address]. By joining [employer]’s FollowMyHealth portal, you will also be able to view your health information using other applications (apps) compatible with our system.

## 2024-05-27 NOTE — ED PROVIDER NOTE - PHYSICAL EXAMINATION
Gen: Patient is well-appearing, NAD, AAOx3  HEENT: NCAT, normal conjunctiva, tongue midline, oral mucosa moist  Abd: soft, NT, ND, no guarding, no rigidity, no rebound tenderness  : unremarkable penis exam, no erythema or rash or lesion noted, rouse in place, draining appropriately   MSK: no visible deformities, ROM normal in UE/LE  Neuro: No focal sensory or motor deficits  Skin: Warm, well perfused, no rash, no leg swelling  Psych: normal affect, calm

## 2024-05-27 NOTE — ED PROVIDER NOTE - NSFOLLOWUPINSTRUCTIONS_ED_ALL_ED_FT
You came to the Emergency Room because of concern for penile discomfort due to the rouse catheter.     Your catheter appears to be functioning appropriately.     You may take Tylenol 1000 mg every 8 hours (no more than 4000 mg per 24 hours) as needed for pain.     Please follow up with your Urologist Provider in the clinic within the next 7 days to monitor your symptoms.     Please come back to the Emergency Room if your symptoms get worse.

## 2024-06-25 ENCOUNTER — EMERGENCY (EMERGENCY)
Facility: HOSPITAL | Age: 64
LOS: 1 days | Discharge: HOME | End: 2024-06-25
Attending: EMERGENCY MEDICINE | Admitting: EMERGENCY MEDICINE
Payer: COMMERCIAL

## 2024-06-25 VITALS
OXYGEN SATURATION: 100 % | DIASTOLIC BLOOD PRESSURE: 61 MMHG | SYSTOLIC BLOOD PRESSURE: 105 MMHG | RESPIRATION RATE: 15 BRPM | TEMPERATURE: 98 F | HEIGHT: 67 IN | HEART RATE: 79 BPM

## 2024-06-25 DIAGNOSIS — Z98.89 OTHER SPECIFIED POSTPROCEDURAL STATES: Chronic | ICD-10-CM

## 2024-06-25 LAB
APPEARANCE UR: CLEAR — SIGNIFICANT CHANGE UP
BACTERIA # UR AUTO: NEGATIVE /HPF — SIGNIFICANT CHANGE UP
BILIRUB UR-MCNC: NEGATIVE — SIGNIFICANT CHANGE UP
COLOR SPEC: YELLOW — SIGNIFICANT CHANGE UP
DIFF PNL FLD: NEGATIVE — SIGNIFICANT CHANGE UP
GLUCOSE UR QL: NEGATIVE MG/DL — SIGNIFICANT CHANGE UP
KETONES UR-MCNC: NEGATIVE MG/DL — SIGNIFICANT CHANGE UP
LEUKOCYTE ESTERASE UR-ACNC: NEGATIVE — SIGNIFICANT CHANGE UP
NITRITE UR-MCNC: NEGATIVE — SIGNIFICANT CHANGE UP
PH UR: 6 — SIGNIFICANT CHANGE UP (ref 5–8)
PROT UR-MCNC: NEGATIVE MG/DL — SIGNIFICANT CHANGE UP
RBC CASTS # UR COMP ASSIST: 0 /HPF — SIGNIFICANT CHANGE UP (ref 0–4)
SP GR SPEC: 1.01 — SIGNIFICANT CHANGE UP (ref 1–1.03)
UROBILINOGEN FLD QL: 0.2 MG/DL — SIGNIFICANT CHANGE UP (ref 0.2–1)
WBC UR QL: 0 /HPF — SIGNIFICANT CHANGE UP (ref 0–5)

## 2024-06-25 PROCEDURE — 99283 EMERGENCY DEPT VISIT LOW MDM: CPT

## 2024-06-25 RX ORDER — CEFPODOXIME PROXETIL 100 MG
1 TABLET ORAL
Qty: 14 | Refills: 0
Start: 2024-06-25 | End: 2024-07-01

## 2024-06-25 NOTE — ED PROVIDER NOTE - PHYSICAL EXAMINATION
GENERAL: Awake, alert, NAD  LUNGS: non labored breathing   ABDOMEN: Soft, , non tender, non distended, no rebound, no guarding  EXT: No edema, no calf tenderness, 2+ DP pulses bilaterally, no deformities.  NEURO: A&Ox3. Moving all extremities.  SKIN: Warm and dry. No rash.  PSYCH: Normal affect.

## 2024-06-25 NOTE — ED PROVIDER NOTE - OBJECTIVE STATEMENT
64-year-old male chief complaint urinary obstruction.  Patient notes last time he had a urinary movement was approximately within the last hour after he sat down and use the toilet in the ED.  Patient has a history of urinary obstruction with UTIs.  Most recently as of this past Thursday patient has a Klebsiella infection currently being treated for 1-2 doses of Augmentin.  Patient otherwise has history of BPH otherwise denies any fever chest pain shortness of breath nausea vomiting.  Patient had a Avitia removed approximately 3 weeks ago states he is having difficulty urinating since having removed but is able to urinate.

## 2024-06-25 NOTE — ED PROVIDER NOTE - CLINICAL SUMMARY MEDICAL DECISION MAKING FREE TEXT BOX
Given history and physical exam clinical concern for chronic UTIs.  Patient currently on Augmentin and states it causes him constipation wishes to try something else.  Patient has enlarged prostate will likely need Avitia.  Patient requesting at this time.  Patient wishes to follow-up with urologist as well.

## 2024-06-25 NOTE — ED PROVIDER NOTE - PATIENT PORTAL LINK FT
You can access the FollowMyHealth Patient Portal offered by [facility] by registering at the following website: [email or web address]. By joining [employer]’s FollowMyHealth portal, you will also be able to view your health information using other applications (apps) compatible with our system.

## 2024-06-25 NOTE — ED PROVIDER NOTE - NSFOLLOWUPCLINICS_GEN_ALL_ED_FT
[facility]  Urology  [address] [place] [address]  Phone: [phone]  Fax:   Follow Up Time: 1-3 Days

## 2024-06-25 NOTE — ED ADULT NURSE NOTE - NSFALLUNIVINTERV_ED_ALL_ED
Bed/Stretcher in lowest position, wheels locked, appropriate side rails in place/Call bell, personal items and telephone in reach/Instruct patient to call for assistance before getting out of bed/chair/stretcher/Non-slip footwear applied when patient is off stretcher/[place] to call system/Physically safe environment - no spills, clutter or unnecessary equipment/Purposeful proactive rounding/Room/bathroom lighting operational, light cord in reach

## 2024-06-25 NOTE — ED ADULT TRIAGE NOTE - CHIEF COMPLAINT QUOTE
presents C/O urinary retention. No complaints of chest pain, headache, nausea, dizziness, vomiting  SOB, fever, chills verbalized. Phx BPH

## 2024-06-25 NOTE — ED ADULT NURSE NOTE - OBJECTIVE STATEMENT
Patient received in room 13. Patient is AOx4, ambulatory, able to speak in full sentences, c/o urinary retention. Patient has a past medical history of BPH on Flomax. Patient endorsing suprapubic pain, abdomen non distended, but tender on palpation. Patient states last Avitia he had was in May and it was in place for about 3 weeks. He also endorses being on Augmentin for recent UTI but states he had severe constipation which caused him to stop. Prostate enlarged on assessment. 14F Avitia placed using sterile technique; 150cc's of clear, yellow fluid draining. Patient tolerated Avitia insertion well. Patient placed on leg bag for comfort. Patient in no signs of acute distress, resting comfortably on stretcher. Respirations even and unlabored, chest rise symmetrical b/l. Denies chest pain, SOB, N/V, fever or chills. Comfort measures maintained. Bed in lowest position. Safety Maintained.

## 2024-06-25 NOTE — ED PROVIDER NOTE - ATTENDING CONTRIBUTION TO CARE
63 y/o M with h/o BPH p/w urinary retention.  Pt reports that he recently required a rouse catheter due to retention, had his catheter removed approx 1 week ago.  Pt follows with outpatient urologist (LEE Padilla) and was started on augmentin for a UTI 2 days ago (pt has culture sensitivities with him).  Pt states the augmentin is giving him constipation and he cannot take it.  No fever, n/v, diarrhea, hematuria.    Well appearing, lying comfortably in stretcher, awake and alert, nontoxic.  AF/VSS.  Lungs cta bl.  Cards nl S1/S2, RRR, no MRG.  Abd soft ntnd.  No focal neuro deficits.    Pt offered rouse for retention, will change abx, dc with close outpatient uro follow-up.

## 2024-06-25 NOTE — ED ADULT TRIAGE NOTE - ESI TRIAGE ACUITY LEVEL, MLM
Physical Therapy Treatment    Patient Name:  Tosin Cortez   MRN:  [id number]    Recommendations:     Discharge Recommendations: intermediate care facility/nursing home  Discharge Equipment Recommendations: none  Barriers to discharge: Inaccessible home    Assessment:     Tosin Cortez is a 89 y.o. female admitted with a medical diagnosis of Frailty syndrome in geriatric patient.  She presents with the following impairments/functional limitations: weakness, impaired endurance, impaired functional mobility, gait instability, impaired balance, decreased coordination, decreased lower extremity function. PTA provided patient with visual, verbal, and tactile cues for proper form of treatment tasks. Patient was found positioned in increased B hip internal rotation, so she was provided with cues to keep BLE In neutral while sitting. Patient required increased assistance for sit to stand transfer, and she also had decreased gait distance this date.     Rehab Prognosis: Fair; patient would benefit from acute skilled PT services to address these deficits and reach maximum level of function.    Recent Surgery: * No surgery found *      Plan:     During this hospitalization, patient to be seen 5 x/week to address the identified rehab impairments via gait training, therapeutic activities, therapeutic exercises and progress toward the following goals:    Plan of Care Expires:  08/04/23    Subjective     Chief Complaint: Patient stated that she did not sleep last night, and she also stated that she felt weak.   Patient/Family Comments/goals: Pt to dc to ns home.  Pain/Comfort:  Pain Rating 1: 0/10      Objective:     Communicated with pt prior to session. Patient found up in chair with oxygen upon PT entry to room.     General Precautions: Standard, hearing impaired, fall  Orthopedic Precautions: RLE weight bearing as tolerated, RLE anterior precautions  Braces:    Respiratory Status: Nasal cannula, flow 2 L/min     Functional  Mobility:  Transfers:     Sit to Stand:  moderate assistance and of 1 persons with rolling walker  Gait: Pt amb'd with RW x 5 ft with min Ax1.      AM-PAC 6 CLICK MOBILITY  Turning over in bed (including adjusting bedclothes, sheets and blankets)?: 4  Sitting down on and standing up from a chair with arms (e.g., wheelchair, bedside commode, etc.): 4  Moving from lying on back to sitting on the side of the bed?: 3  Moving to and from a bed to a chair (including a wheelchair)?: 3  Need to walk in hospital room?: 3  Climbing 3-5 steps with a railing?: 1  Basic Mobility Total Score: 18       Treatment & Education:  Seated BLE ther ex 2x10 reps each: LAQ 1.5#, DF, PF 1.5#, resisted hip add using blue ball, hip ABD with red TB    Patient left up in chair with  OT present.    GOALS:   Multidisciplinary Problems       Physical Therapy Goals          Problem: Physical Therapy    Goal Priority Disciplines Outcome Goal Variances Interventions   Physical Therapy Goal     PT, PT/OT Ongoing, Progressing     Description: LTG - 3 weeks  1. Pt SBA with supine to/from sit using strap or cane to life RLE onto bed surface.-PROGRESSING  2. Pt will amb 150 ft with SBA using RW.-PROGRESSING  3. Pt will be SBA with transfers throughout.-PROGRESSING                         Time Tracking:     PT Received On: 07/21/23  PT Start Time: 1025     PT Stop Time: 1055  PT Total Time (min): 30 min     Billable Minutes: Gait Training 5 and Therapeutic Exercise 25    Treatment Type: Treatment  PT/PTA: PTA     Number of PTA visits since last PT visit: 2     07/21/2023   2

## 2024-06-25 NOTE — ED PROVIDER NOTE - NSFOLLOWUPINSTRUCTIONS_ED_ALL_ED_FT
WHAT YOU NEED TO KNOW:    An enlarged prostate (BPH) is a common condition in older adults. BPH develops because the number of prostate cells increases (hyperplasia) or the cells get bigger (hypertrophy). The prostate wraps around the urethra. An enlarged prostate can press on the urethra. This may cause problems with storing urine or emptying your bladder completely.  Male Reproductive System    DISCHARGE INSTRUCTIONS:    Call your doctor or urologist if:    You see blood in your urine.    You are not able to urinate.    Your bladder feels very full and painful.    You have new or worsening symptoms.    You have a fever.    You have questions or concerns about your condition or care.  Medicines:    Medicines may be used to relax the muscles in your prostate and bladder. This may help you urinate more easily. You may also need medicine that helps shrink, or slow the growth of your prostate.    Take your medicine as directed. Contact your healthcare provider if you think your medicine is not helping or if you have side effects. Tell your provider if you are allergic to any medicine. Keep a list of the medicines, vitamins, and herbs you take. Include the amounts, and when and why you take them. Bring the list or the pill bottles to follow-up visits. Carry your medicine list with you in case of an emergency.  What you can do to manage your symptoms:    Urinate on a regular schedule. This will train your bladder to hold urine longer. A larger amount of urine may make it easier to urinate.    Drink less liquid during the day. Do not have liquid for several hours before you go to bed at night. Do not drink large amounts of any liquid at one time.    Limit alcohol and caffeine. These can irritate your bladder and make your symptoms worse.    Eat less salt. Salt can cause fluid buildup and make it harder to urinate. Examples of salty foods are chips, cured meats, and canned soups. Do not use table salt.    Elevate your legs if you have swelling. Elevate (raise) your legs above the level of your heart. This can relieve swelling caused by fluid buildup. You may not have to get up in the night to urinate.  Elevate Leg - Male (Adult)      Exercise regularly. Exercise can help improve your symptoms. Ask your healthcare provider what a healthy weight for you is. Aim to get at least 30 minutes of exercise on most days of the week.  Walking for Exercise  Follow up with your doctor or urologist as directed: Write down your questions so you remember to ask them during your visits. Please take cefpodoxime 2 times a day for next 7 days and follow-up with your urologist    An enlarged prostate (BPH) is a common condition in older adults. BPH develops because the number of prostate cells increases (hyperplasia) or the cells get bigger (hypertrophy). The prostate wraps around the urethra. An enlarged prostate can press on the urethra. This may cause problems with storing urine or emptying your bladder completely.  Male Reproductive System    DISCHARGE INSTRUCTIONS:    Call your doctor or urologist if:    You see blood in your urine.    You are not able to urinate.    Your bladder feels very full and painful.    You have new or worsening symptoms.    You have a fever.    You have questions or concerns about your condition or care.  Medicines:    Medicines may be used to relax the muscles in your prostate and bladder. This may help you urinate more easily. You may also need medicine that helps shrink, or slow the growth of your prostate.    Take your medicine as directed. Contact your healthcare provider if you think your medicine is not helping or if you have side effects. Tell your provider if you are allergic to any medicine. Keep a list of the medicines, vitamins, and herbs you take. Include the amounts, and when and why you take them. Bring the list or the pill bottles to follow-up visits. Carry your medicine list with you in case of an emergency.  What you can do to manage your symptoms:    Urinate on a regular schedule. This will train your bladder to hold urine longer. A larger amount of urine may make it easier to urinate.    Drink less liquid during the day. Do not have liquid for several hours before you go to bed at night. Do not drink large amounts of any liquid at one time.    Limit alcohol and caffeine. These can irritate your bladder and make your symptoms worse.    Eat less salt. Salt can cause fluid buildup and make it harder to urinate. Examples of salty foods are chips, cured meats, and canned soups. Do not use table salt.    Elevate your legs if you have swelling. Elevate (raise) your legs above the level of your heart. This can relieve swelling caused by fluid buildup. You may not have to get up in the night to urinate.  Elevate Leg - Male (Adult)      Exercise regularly. Exercise can help improve your symptoms. Ask your healthcare provider what a healthy weight for you is. Aim to get at least 30 minutes of exercise on most days of the week.  Walking for Exercise  Follow up with your doctor or urologist as directed: Write down your questions so you remember to ask them during your visits.

## 2024-06-26 LAB
CULTURE RESULTS: NO GROWTH — SIGNIFICANT CHANGE UP
SPECIMEN SOURCE: SIGNIFICANT CHANGE UP

## 2024-07-20 ENCOUNTER — EMERGENCY (EMERGENCY)
Facility: HOSPITAL | Age: 64
LOS: 1 days | Discharge: HOME | End: 2024-07-20
Attending: EMERGENCY MEDICINE | Admitting: EMERGENCY MEDICINE
Payer: COMMERCIAL

## 2024-07-20 VITALS
SYSTOLIC BLOOD PRESSURE: 124 MMHG | HEIGHT: 67 IN | HEART RATE: 62 BPM | TEMPERATURE: 98 F | OXYGEN SATURATION: 96 % | DIASTOLIC BLOOD PRESSURE: 70 MMHG | RESPIRATION RATE: 18 BRPM

## 2024-07-20 DIAGNOSIS — Z98.89 UNDEFINED: Chronic | ICD-10-CM

## 2024-07-20 PROCEDURE — 99283 EMERGENCY DEPT VISIT LOW MDM: CPT

## 2024-07-20 NOTE — ED ADULT NURSE NOTE - OBJECTIVE STATEMENT
pt requesting urinary catheter drainage bag to be changed d/t hole in bag/leaking. pt with no other complaints. drainage bag changed, pt educated on care of urinary catheter and drainage bag. pt instructed to keep followup with urology.

## 2024-07-20 NOTE — ED PROVIDER NOTE - CLINICAL SUMMARY MEDICAL DECISION MAKING FREE TEXT BOX
Patient with DM OA shoulder/back problems   BPH with current indwelling rouse with pvt urologist; patient met in triage with triage rn pt here solely for change of punctured rouse bag otherwise in [facility] with no new complaints has f/u. Will change bag and d/c to RTED PRN

## 2024-07-20 NOTE — ED ADULT NURSE NOTE - NSFALLUNIVINTERV_ED_ALL_ED
Bed/Stretcher in lowest position, wheels locked, appropriate side rails in place/Call bell, personal items and telephone in reach/Instruct patient to call for assistance before getting out of bed/chair/stretcher/Non-slip footwear applied when patient is off stretcher/[place] to call system/Physically safe environment - no spills, clutter or unnecessary equipment/Purposeful proactive rounding/Room/bathroom lighting operational, light cord in reach

## 2024-07-20 NOTE — ED PROVIDER NOTE - PATIENT PORTAL LINK FT
You can access the FollowMyHealth Patient Portal offered by [facility] by registering at the following website: [email or web address]. By joining [employer]’s FollowMyHealth portal, you will also be able to view your health information using other applications (apps) compatible with our system.

## 2024-07-20 NOTE — ED PROVIDER NOTE - OBJECTIVE STATEMENT
Patient with DM OA shoulder/back problems   BPH with current indwelling rouse with pvt urologist; patient met in triage with triage rn pt here solely for change of punctured rouse bag otherwise in us[facility] with no new complaints has f/u

## 2024-07-20 NOTE — ED ADULT TRIAGE NOTE - CHIEF COMPLAINT QUOTE
Pt c/o urinary catheter complication. Pt states he had rouse catheter placed 3 weeks ago d/t urinary retention. Pt states there is a hole in the bag and it is leaking. Requesting bag change. No other complaints at this time.

## 2024-07-20 NOTE — ED PROVIDER NOTE - NSFOLLOWUPINSTRUCTIONS_ED_ALL_ED_FT
We changed your rouse bag as requested  Please keep your  urology appointment and also please return if you have any other concerns    Rouse Catheter Placement and Care  WHAT YOU NEED TO KNOW:  A Rouse catheter is a sterile tube that is inserted into your bladder to drain urine. It is also called an indwelling urinary catheter. The tip of the catheter has a small balloon filled with solution that holds the catheter inside your bladder.  DISCHARGE INSTRUCTIONS:  Seek care immediately if:   •Your catheter comes out.   •You suddenly have material that looks like sand in the tubing or drainage bag.  •No urine is draining into the bag and you have checked the system.  •You have pain in your hip, back, pelvis, or lower abdomen.  •You are confused or cannot think clearly.  Call your doctor or urologist if:   •You have a fever.  •You have bladder spasms for more than 1 day after the catheter is placed.  •You see blood in the tubing or drainage bag.  •You have a rash or itching where the catheter tube is secured to your skin.  •Urine leaks from or around the catheter, tubing, or drainage bag.  •The closed drainage system has accidently come open or apart.   •You see a layer of crystals inside the tubing.  •You have questions or concerns about your condition or care.  Care for your Rouse catheter:   •Clean your genital area 2 times every day. Clean your catheter and the area around where it was inserted. Use soap and water. Clean your anal opening and catheter area after every bowel movement.   •Secure the catheter tube so you do not pull or move the catheter. This helps prevent pain and bladder spasms. Healthcare providers will show you how to use medical tape or a strap to secure the catheter tube to your body.   •Keep a closed drainage system. Your Rouse catheter should always be attached to the drainage bag to form a closed system. Do not disconnect any part of the closed system unless you need to change the bag.  Care for your drainage bag:   •Ask if a leg bag is right for you. A leg bag can be worn under your clothes. Ask your healthcare provider for more information about a leg bag.   •Keep the drainage bag below the level of your waist. This helps stop urine from moving back up the tubing and into your bladder. Do not loop or kink the tubing. This can cause urine to back up and collect in your bladder. Do not let the drainage bag touch or lie on the floor.  •Empty the drainage bag when needed. The weight of a full drainage bag can be painful. Empty the drainage bag every 3 to 6 hours or when it is ? full.   •Clean and change the drainage bag as directed. Ask your healthcare provider how often you should change the drainage bag and what cleaning solution to use. Wear disposable gloves when you change the bag. Do not allow the end of the catheter or tubing to touch anything. Clean the ends with an alcohol pad before you reconnect them.  What to do if problems develop:   •No urine is draining into the bag: ?Check for kinks in the tubing and straighten them out.   ?Check the tape or strap used to secure the catheter tube to your skin. Make sure it is not blocking the tube.   ?Make sure you are not sitting or lying on the tubing.  ?Make sure the urine bag is hanging below the level of your waist.  •Urine leaks from or around the catheter, tubing, or drainage bag: Check if the closed drainage system has accidently come open or apart. Clean the catheter and tubing ends with a new alcohol pad and reconnect them.   Prevent an infection:   •Wash your hands often. Wash before and after you touch your catheter, tubing, or drainage bag. Use soap and water. Wear clean disposable gloves when you care for your catheter or disconnect the drainage bag. Wash your hands before you prepare or eat food.   Handwashing   •Drink liquids as directed. Ask your healthcare provider how much liquid to drink each day and which liquids are best for you. Liquids will help flush your kidneys and bladder to help prevent infection.  Follow up with your doctor or urologist as directed: Write down your questions so you remember to ask them during your visits.     Colocación y cuidado de un catéter de Rouse  LO QUE NECESITA SABER:  Un catéter de Rouse es un tubo esterilizado que se coloca en la vejiga para drenar la orina. También se conoce arslan catéter urinario permanente. El catéter tiene un balón pequeño en el extremo que se llena de slim solución para mantener la sonda dentro de kidd vejiga.  INSTRUCCIONES SOBRE EL PAULETTE HOSPITALARIA:  Busque atención médica de inmediato si:  •A usted se le sale el catéter.  •Se acumula de forma repentina un material parecido a la arena en el tubo o en la bolsa de drenaje.  •No se drena orina a la bolsa y comprobó que no existe un problema con el sistema de drenaje.  •Usted siente dolor en la cadera, espalda, pelvis o parte baja del estómago.  •Usted está confundido o no puede pensar con claridad.  Llame a kidd médico o urólogo si:  •Tiene fiebre.  •Tiene espasmos en la vejiga jimmy más de 1 día después de que se coloca el catéter.  •Usted nota nuris en el tubo o la bolsa de drenaje.  •Usted tiene un salpullido o comezón en el lugar donde el catéter está asegurado a la piel.  •Se escapa orina del catéter, de alrededor del catéter, del tubo o la bolsa de drenaje.  •El sistema cerrado de drenaje se abre o desconecta accidentalmente.  •Usted ve slim capa de nadiya dentro del tubo.  •Usted tiene preguntas o inquietudes acerca de kidd condición o cuidado.  Cuidado de kidd catéter Rouse:  •Lávese el área de los genitales 2 veces por día.Lave el catéter y alrededor del área por donde lo insertaron. Utilice agua y jabón. Limpie el área de catéter y abertura anal después de cada deposición.  •Asegure el tubo del catéter, de modo que no pueda halar o  el catéter. [place] evitará que sienta dolor y tenga espasmos en la vejiga. Los médicos le enseñarán a usar cinta quirúrgica o slim bonilla para asegurar el tubo a kidd cuerpo.  •Mantenga cerrado el sistema de drenaje.El catéter de Rouse siempre debe estar unido a la bolsa de drenaje y formar un sistema cerrado. No desconecte ninguna parte del sistema cerrado, a menos que necesite cambiar la bolsa.  Cuidado de la bolsa de drenaje:  •Pregunte si slim bolsa de pierna es adecuada para usted.Puede llevar la bolsa de pierna bajo la ropa. Solicite a kidd médico más información sobre la bolsa de pierna.  •Mantenga la bolsa de drenaje por debajo del nivel de kidd cintura.De cesilia modo impedirá que la orina suba por el tubo y regrese a la vejiga. No permita que el tubo se acode o enrosque. [place] podría provocar que la orina se acumule en la vejiga. No permita que la bolsa de drenaje toque el suelo o se apoye sobre cesilia.  •Vacíe la bolsa de drenaje cuando sea necesario.Es posible que sienta dolor cuando la bolsa esté llena. Vacíe la bolsa de drenaje cada 3 a 6 horas o cuando esté ? llena.  •Limpie y cambie la bolsa de drenaje arslan se le indique.Pregunte a kidd médico con qué frecuencia debe cambiar la bolsa de drenaje y qué solución debe usar para limpiarla. Póngase guantes descartables para cambiar la bolsa. No permita que el extremo del catéter o del tubo toque otra cosa. Use un paño humedecido en alcohol para limpiar los extremos antes de volver a conectarlos.  Qué hacer si se presentan problemas:  •La orina no se drena a la bolsa:?Fíjese si el tubo está acodado y enderécelo si hace falta.  ?Compruebe que la cinta o bonilla que sujeta el catéter a kidd piel no esté bloqueando la sonda. Compruebe que no está bloqueando el tubo.  ?Compruebe que no está sentado o acostado sobre la sonda.  ?Asegúrese de que la bolsa de drenaje se encuentre por debajo del nivel de kidd cintura.  •Se escapa orina del catéter, de alrededor del catéter, del tubo o de la bolsa de drenaje:Fíjese si el sistema de drenaje cerrado se banuelos abierto o desconectado accidentalmente. Limpie los extremos del catéter y el tubo con un paño nuevo humedecido con alcohol y vuelva a conectarlos.  Prevenga slim infección:  •Lávese las prosper frecuentemente.Lávese las prosper antes y después de tocar el catéter, el tubo o la bolsa de drenaje. Utilice agua y jabón. Póngase guantes descartables limpios para manejar el catéter o para desconectar la bolsa de drenaje. Lávese las prosper antes de comer o preparar alimentos.   Lavado de prosper   •Ensign líquidos arslna se le haya indicado.Pregunte a kidd médico sobre la cantidad de líquido que necesita irena todos los joselo y cuáles le recomienda. Los líquidos ayudarán a limpiar los riñones y la vejiga para evitar que contraiga slim infección.  Acuda a rose mary consultas de control con kidd médico o urólogo según le indicaron:Anote rose mary preguntas para que se acuerde de hacerlas jimmy rose mary visitas.

## 2024-10-25 ENCOUNTER — APPOINTMENT (OUTPATIENT)
Dept: ORTHOPEDIC SURGERY | Facility: CLINIC | Age: 64
End: 2024-10-25
Payer: OTHER MISCELLANEOUS

## 2024-10-25 PROCEDURE — 99075 MEDICAL TESTIMONY: CPT

## 2024-10-29 ENCOUNTER — APPOINTMENT (OUTPATIENT)
Dept: ORTHOPEDIC SURGERY | Facility: CLINIC | Age: 64
End: 2024-10-29
Payer: COMMERCIAL

## 2024-10-29 VITALS — HEIGHT: 67 IN | WEIGHT: 200 LBS | BODY MASS INDEX: 31.39 KG/M2

## 2024-10-29 DIAGNOSIS — M54.16 RADICULOPATHY, LUMBAR REGION: ICD-10-CM

## 2024-10-29 PROCEDURE — 72170 X-RAY EXAM OF PELVIS: CPT

## 2024-10-29 PROCEDURE — 99204 OFFICE O/P NEW MOD 45 MIN: CPT

## 2024-10-29 PROCEDURE — 72070 X-RAY EXAM THORAC SPINE 2VWS: CPT

## 2024-10-29 PROCEDURE — 72110 X-RAY EXAM L-2 SPINE 4/>VWS: CPT

## 2024-10-29 RX ORDER — MELOXICAM 7.5 MG/1
7.5 TABLET ORAL DAILY
Qty: 30 | Refills: 0 | Status: ACTIVE | COMMUNITY
Start: 2024-10-29 | End: 1900-01-01

## 2025-01-29 NOTE — ED PROVIDER NOTE - MOUTH NORMAL
Left Voicemail (2nd Attempt) for the patient to call back and schedule the following:    Appointment type: Post Covid Return Video Visit  Provider: Amisha Gautam  Return date: around 3/23/25  Specialty phone number: [phone]  Additional appointment(s) needed: NA  Additonal Notes: 2 month follow up    Raine Rizzo on 1/29/2025 at 2:52 PM       normal mucosa

## 2025-04-22 NOTE — ED ADULT NURSE NOTE - CHIEF COMPLAINT
Samina from [facility] is trying to reach patient regarding his benefits. Please inform patient . Phone # [phone] patient is not responding.    Thank you!!   The patient is a 63y Male complaining of multiple medical complaints.

## 2025-05-12 ENCOUNTER — APPOINTMENT (OUTPATIENT)
Dept: ORTHOPEDIC SURGERY | Facility: CLINIC | Age: 65
End: 2025-05-12
Payer: OTHER MISCELLANEOUS

## 2025-05-12 VITALS — WEIGHT: 175 LBS | HEIGHT: 67 IN | BODY MASS INDEX: 27.47 KG/M2

## 2025-05-12 DIAGNOSIS — M75.122 COMPLETE ROTATOR CUFF TEAR OR RUPTURE OF LEFT SHOULDER, NOT SPECIFIED AS TRAUMATIC: ICD-10-CM

## 2025-05-12 DIAGNOSIS — N40.0 BENIGN PROSTATIC HYPERPLASIA WITHOUT LOWER URINARY TRACT SYMPMS: ICD-10-CM

## 2025-05-12 DIAGNOSIS — M75.02 ADHESIVE CAPSULITIS OF LEFT SHOULDER: ICD-10-CM

## 2025-05-12 DIAGNOSIS — S43.432D SUPERIOR GLENOID LABRUM LESION OF LEFT SHOULDER, SUBSEQUENT ENCOUNTER: ICD-10-CM

## 2025-05-12 DIAGNOSIS — Z98.890 OTHER SPECIFIED POSTPROCEDURAL STATES: ICD-10-CM

## 2025-05-12 DIAGNOSIS — M75.42 IMPINGEMENT SYNDROME OF LEFT SHOULDER: ICD-10-CM

## 2025-05-12 DIAGNOSIS — M75.52 BURSITIS OF LEFT SHOULDER: ICD-10-CM

## 2025-05-12 PROCEDURE — 99213 OFFICE O/P EST LOW 20 MIN: CPT

## 2025-05-12 RX ORDER — FINASTERIDE 1 MG/1
TABLET ORAL
Refills: 0 | Status: ACTIVE | COMMUNITY

## 2025-05-12 NOTE — WORK
[Sprain/Strain] : sprain/strain [Was the competent medical cause of the injury] : was the competent medical cause of the injury [Are consistent with the injury] : are consistent with the injury [Consistent with my objective findings] : consistent with my objective findings [Total] : total [Reveals pre-existing condition(s) that may affect treatment/prognosis] : reveals pre-existing condition(s) that may affect treatment/prognosis [Cannot return to work because ________] : cannot return to work because [unfilled] [N/A] : : Not Applicable [Patient] : patient [No Rx restrictions] : No Rx restrictions. [FreeTextEntry2] : prior shoulder surgery  set-up required

## 2025-06-05 ENCOUNTER — TRANSCRIPTION ENCOUNTER (OUTPATIENT)
Age: 65
End: 2025-06-05

## 2025-07-16 NOTE — ED ADULT NURSE NOTE - OBJECTIVE STATEMENT
show pt received to room #3 with c/o LLQ pain, constipation, and difficulty urinating. pt states he has an enlarged prostate and when he becomes constipated he has difficulty urinating. pt aox4, ambulatory without assistance. Denies vomiting. IV placed, labs drawn and sent. urine obtained and sent to lab. pt seen by MD. fluids infusing as per MDs orders. no further orders at this time.

## 2025-07-17 ENCOUNTER — NON-APPOINTMENT (OUTPATIENT)
Age: 65
End: 2025-07-17

## 2025-07-17 ENCOUNTER — APPOINTMENT (OUTPATIENT)
Dept: ORTHOPEDIC SURGERY | Facility: CLINIC | Age: 65
End: 2025-07-17
Payer: OTHER MISCELLANEOUS

## 2025-07-17 PROBLEM — E11.9 DIABETES: Status: RESOLVED | Noted: 2025-07-17 | Resolved: 2025-07-17

## 2025-07-17 PROBLEM — M65.312 TRIGGER FINGER OF LEFT THUMB: Status: ACTIVE | Noted: 2025-07-17

## 2025-07-17 PROCEDURE — 99204 OFFICE O/P NEW MOD 45 MIN: CPT | Mod: 25

## 2025-07-17 PROCEDURE — 20550 NJX 1 TENDON SHEATH/LIGAMENT: CPT | Mod: F4

## 2025-07-17 RX ORDER — TADALAFIL 2.5 MG/1
TABLET, FILM COATED ORAL
Refills: 0 | Status: ACTIVE | COMMUNITY

## 2025-08-14 ENCOUNTER — NON-APPOINTMENT (OUTPATIENT)
Age: 65
End: 2025-08-14

## 2025-08-14 ENCOUNTER — APPOINTMENT (OUTPATIENT)
Dept: ORTHOPEDIC SURGERY | Facility: CLINIC | Age: 65
End: 2025-08-14
Payer: OTHER MISCELLANEOUS

## 2025-08-14 DIAGNOSIS — M65.352 TRIGGER FINGER, LEFT LITTLE FINGER: ICD-10-CM

## 2025-08-14 PROCEDURE — 99214 OFFICE O/P EST MOD 30 MIN: CPT

## 2025-08-18 DIAGNOSIS — M79.643 PAIN IN UNSPECIFIED HAND: ICD-10-CM

## 2025-08-18 DIAGNOSIS — M79.646 PAIN IN UNSPECIFIED HAND: ICD-10-CM

## 2025-09-08 ENCOUNTER — APPOINTMENT (OUTPATIENT)
Dept: MRI IMAGING | Facility: CLINIC | Age: 65
End: 2025-09-08

## 2025-09-09 ENCOUNTER — APPOINTMENT (OUTPATIENT)
Dept: ORTHOPEDIC SURGERY | Facility: CLINIC | Age: 65
End: 2025-09-09